# Patient Record
Sex: MALE | Race: WHITE | NOT HISPANIC OR LATINO
[De-identification: names, ages, dates, MRNs, and addresses within clinical notes are randomized per-mention and may not be internally consistent; named-entity substitution may affect disease eponyms.]

---

## 2015-06-10 RX ORDER — DOFETILIDE 0.25 MG/1
1 CAPSULE ORAL
Qty: 0 | Refills: 0 | DISCHARGE
Start: 2015-06-10

## 2017-01-25 ENCOUNTER — APPOINTMENT (OUTPATIENT)
Dept: ELECTROPHYSIOLOGY | Facility: CLINIC | Age: 72
End: 2017-01-25

## 2017-01-25 VITALS
HEIGHT: 68 IN | SYSTOLIC BLOOD PRESSURE: 160 MMHG | BODY MASS INDEX: 36.22 KG/M2 | DIASTOLIC BLOOD PRESSURE: 80 MMHG | HEART RATE: 75 BPM | WEIGHT: 239 LBS

## 2017-08-14 ENCOUNTER — TRANSCRIPTION ENCOUNTER (OUTPATIENT)
Age: 72
End: 2017-08-14

## 2017-08-14 ENCOUNTER — OUTPATIENT (OUTPATIENT)
Dept: INPATIENT UNIT | Facility: HOSPITAL | Age: 72
LOS: 1 days | Discharge: HOME | End: 2017-08-14
Payer: MEDICARE

## 2017-08-14 VITALS
OXYGEN SATURATION: 97 % | TEMPERATURE: 98 F | DIASTOLIC BLOOD PRESSURE: 81 MMHG | HEIGHT: 70 IN | SYSTOLIC BLOOD PRESSURE: 157 MMHG | WEIGHT: 229.06 LBS | HEART RATE: 80 BPM | RESPIRATION RATE: 18 BRPM

## 2017-08-14 DIAGNOSIS — R07.89 OTHER CHEST PAIN: ICD-10-CM

## 2017-08-14 DIAGNOSIS — Z95.5 PRESENCE OF CORONARY ANGIOPLASTY IMPLANT AND GRAFT: Chronic | ICD-10-CM

## 2017-08-14 PROCEDURE — 93010 ELECTROCARDIOGRAM REPORT: CPT

## 2017-08-14 RX ORDER — ATORVASTATIN CALCIUM 80 MG/1
20 TABLET, FILM COATED ORAL AT BEDTIME
Qty: 0 | Refills: 0 | Status: DISCONTINUED | OUTPATIENT
Start: 2017-08-14 | End: 2017-08-15

## 2017-08-14 RX ORDER — DOXAZOSIN MESYLATE 4 MG
8 TABLET ORAL AT BEDTIME
Qty: 0 | Refills: 0 | Status: DISCONTINUED | OUTPATIENT
Start: 2017-08-14 | End: 2017-08-15

## 2017-08-14 RX ORDER — METOPROLOL TARTRATE 50 MG
100 TABLET ORAL DAILY
Qty: 0 | Refills: 0 | Status: DISCONTINUED | OUTPATIENT
Start: 2017-08-14 | End: 2017-08-15

## 2017-08-14 RX ORDER — DOFETILIDE 0.25 MG/1
250 CAPSULE ORAL
Qty: 0 | Refills: 0 | Status: DISCONTINUED | OUTPATIENT
Start: 2017-08-14 | End: 2017-08-15

## 2017-08-14 RX ORDER — LOSARTAN POTASSIUM 100 MG/1
100 TABLET, FILM COATED ORAL DAILY
Qty: 0 | Refills: 0 | Status: DISCONTINUED | OUTPATIENT
Start: 2017-08-14 | End: 2017-08-15

## 2017-08-14 RX ORDER — CLOPIDOGREL BISULFATE 75 MG/1
75 TABLET, FILM COATED ORAL DAILY
Qty: 0 | Refills: 0 | Status: DISCONTINUED | OUTPATIENT
Start: 2017-08-14 | End: 2017-08-15

## 2017-08-14 RX ORDER — ASPIRIN/CALCIUM CARB/MAGNESIUM 324 MG
81 TABLET ORAL DAILY
Qty: 0 | Refills: 0 | Status: DISCONTINUED | OUTPATIENT
Start: 2017-08-14 | End: 2017-08-15

## 2017-08-14 RX ADMIN — Medication 8 MILLIGRAM(S): at 20:59

## 2017-08-14 RX ADMIN — Medication 100 MILLIGRAM(S): at 16:50

## 2017-08-14 RX ADMIN — DOFETILIDE 250 MICROGRAM(S): 0.25 CAPSULE ORAL at 17:20

## 2017-08-14 RX ADMIN — LOSARTAN POTASSIUM 100 MILLIGRAM(S): 100 TABLET, FILM COATED ORAL at 16:50

## 2017-08-14 RX ADMIN — ATORVASTATIN CALCIUM 20 MILLIGRAM(S): 80 TABLET, FILM COATED ORAL at 20:59

## 2017-08-14 NOTE — H&P CARDIOLOGY - PMH
Arthritis    BPH (Benign Prostatic Hyperplasia)    CAD (coronary artery disease)  s/p PCI t Cx 2013-[facility]  HTN (hypertension)    Hypercholesteremia    Obesity    PVD (peripheral vascular disease)  s/p b/l LE PCI Arthritis    BPH (Benign Prostatic Hyperplasia)    CAD (coronary artery disease)  s/p PCI t Cx 2013-[facility]  HTN (hypertension)    Hypercholesteremia    Obesity    PVD (peripheral vascular disease)  s/p b/l LE PCI

## 2017-08-14 NOTE — H&P CARDIOLOGY - HISTORY OF PRESENT ILLNESS
71 yo male PMH CAD with stent x2 (2013), HTN, HLD, Hx Afib (Xarelto last dose 8/12) s/p CV (2015), NSVT s/p cardioversion (2014) and PVD with BL peripheral stents and pernicious anemia presents to [facility] ER on 8/12 c/o wheezing and "pressure like" chest discomfort  with exertion 4 days PTA. Pain lasts approx 5-10 minutes, relieved with rest. In [facility] ER Trop x3 are negative, BNP 1160, CXR: BL pleural effusion with mild CHF- given IV lasix in ER x1 dose, TTE EF 55-65% with LAE, ECG: NSR with PVC's 73 yo male PMH CAD with stent x2 (2013), HTN, HLD, Hx Afib (Xarelto last dose 8/12) s/p CV (2015), NSVT s/p cardioversion (2014) and PVD with BL peripheral stents and pernicious anemia presents to [facility] ER on 8/12 c/o wheezing and "pressure like" chest discomfort  with exertion 4 days PTA. Pain lasts approx 5-10 minutes, relieved with rest. In [facility] ER Trop x3 are negative, BNP 1160, CXR: BL pleural effusion with mild CHF- given IV lasix in ER x1 dose, ECG: NSR with PVC's. Patient transferred to [facility] for cardiac cath for further cardiac evaluation. Upon arrival patient denies chest pain, palpitations, SOB, PND, orthopnea    TTE (1/2017) EF 49%, increased from 40%, dialted 40%, dialted RA, LA, mild MI, Trace TI

## 2017-08-14 NOTE — H&P CARDIOLOGY - PSH
Hernia    S/P coronary artery stent placement  2013 [facility]/vivi  cx x2 Hernia    S/P coronary artery stent placement  2013 [facility]/vivi  cx x2

## 2017-08-15 VITALS
DIASTOLIC BLOOD PRESSURE: 62 MMHG | HEART RATE: 72 BPM | SYSTOLIC BLOOD PRESSURE: 168 MMHG | RESPIRATION RATE: 18 BRPM | OXYGEN SATURATION: 96 % | TEMPERATURE: 98 F

## 2017-08-15 LAB
ANION GAP SERPL CALC-SCNC: 13 MMOL/L — SIGNIFICANT CHANGE UP (ref 5–17)
BUN SERPL-MCNC: 21 MG/DL — SIGNIFICANT CHANGE UP (ref 7–23)
CALCIUM SERPL-MCNC: 8.8 MG/DL — SIGNIFICANT CHANGE UP (ref 8.4–10.5)
CHLORIDE SERPL-SCNC: 104 MMOL/L — SIGNIFICANT CHANGE UP (ref 96–108)
CO2 SERPL-SCNC: 27 MMOL/L — SIGNIFICANT CHANGE UP (ref 22–31)
CREAT SERPL-MCNC: 0.98 MG/DL — SIGNIFICANT CHANGE UP (ref 0.5–1.3)
GLUCOSE SERPL-MCNC: 99 MG/DL — SIGNIFICANT CHANGE UP (ref 70–99)
HCT VFR BLD CALC: 37.1 % — LOW (ref 39–50)
HGB BLD-MCNC: 12.2 G/DL — LOW (ref 13–17)
MCHC RBC-ENTMCNC: 31.9 PG — SIGNIFICANT CHANGE UP (ref 27–34)
MCHC RBC-ENTMCNC: 33.1 GM/DL — SIGNIFICANT CHANGE UP (ref 32–36)
MCV RBC AUTO: 96.5 FL — SIGNIFICANT CHANGE UP (ref 80–100)
PLATELET # BLD AUTO: 161 K/UL — SIGNIFICANT CHANGE UP (ref 150–400)
POTASSIUM SERPL-MCNC: 3.6 MMOL/L — SIGNIFICANT CHANGE UP (ref 3.5–5.3)
POTASSIUM SERPL-SCNC: 3.6 MMOL/L — SIGNIFICANT CHANGE UP (ref 3.5–5.3)
RBC # BLD: 3.84 M/UL — LOW (ref 4.2–5.8)
RBC # FLD: 12.9 % — SIGNIFICANT CHANGE UP (ref 10.3–14.5)
SODIUM SERPL-SCNC: 144 MMOL/L — SIGNIFICANT CHANGE UP (ref 135–145)
WBC # BLD: 5.2 K/UL — SIGNIFICANT CHANGE UP (ref 3.8–10.5)
WBC # FLD AUTO: 5.2 K/UL — SIGNIFICANT CHANGE UP (ref 3.8–10.5)

## 2017-08-15 PROCEDURE — C1894: CPT

## 2017-08-15 PROCEDURE — 93458 L HRT ARTERY/VENTRICLE ANGIO: CPT | Mod: 59

## 2017-08-15 PROCEDURE — 93010 ELECTROCARDIOGRAM REPORT: CPT

## 2017-08-15 PROCEDURE — 92928 PRQ TCAT PLMT NTRAC ST 1 LES: CPT | Mod: LD

## 2017-08-15 PROCEDURE — C1887: CPT

## 2017-08-15 PROCEDURE — 85027 COMPLETE CBC AUTOMATED: CPT

## 2017-08-15 PROCEDURE — 80048 BASIC METABOLIC PNL TOTAL CA: CPT

## 2017-08-15 PROCEDURE — 93005 ELECTROCARDIOGRAM TRACING: CPT

## 2017-08-15 PROCEDURE — C1874: CPT

## 2017-08-15 PROCEDURE — C1769: CPT

## 2017-08-15 RX ORDER — ASPIRIN/CALCIUM CARB/MAGNESIUM 324 MG
1 TABLET ORAL
Qty: 30 | Refills: 11
Start: 2017-08-15 | End: 2018-08-09

## 2017-08-15 RX ORDER — ASPIRIN/CALCIUM CARB/MAGNESIUM 324 MG
1 TABLET ORAL
Qty: 30 | Refills: 11 | OUTPATIENT
Start: 2017-08-15 | End: 2018-08-09

## 2017-08-15 RX ORDER — CLOPIDOGREL BISULFATE 75 MG/1
1 TABLET, FILM COATED ORAL
Qty: 90 | Refills: 3 | OUTPATIENT
Start: 2017-08-15 | End: 2018-08-09

## 2017-08-15 RX ORDER — CLOPIDOGREL BISULFATE 75 MG/1
1 TABLET, FILM COATED ORAL
Qty: 30 | Refills: 11
Start: 2017-08-15 | End: 2018-08-09

## 2017-08-15 RX ORDER — CLOPIDOGREL BISULFATE 75 MG/1
1 TABLET, FILM COATED ORAL
Qty: 30 | Refills: 11 | OUTPATIENT
Start: 2017-08-15 | End: 2018-08-09

## 2017-08-15 RX ADMIN — Medication 100 MILLIGRAM(S): at 05:14

## 2017-08-15 RX ADMIN — LOSARTAN POTASSIUM 100 MILLIGRAM(S): 100 TABLET, FILM COATED ORAL at 05:14

## 2017-08-15 RX ADMIN — DOFETILIDE 250 MICROGRAM(S): 0.25 CAPSULE ORAL at 09:13

## 2017-08-15 RX ADMIN — Medication 81 MILLIGRAM(S): at 05:14

## 2017-08-15 RX ADMIN — CLOPIDOGREL BISULFATE 75 MILLIGRAM(S): 75 TABLET, FILM COATED ORAL at 05:14

## 2017-08-15 NOTE — DISCHARGE NOTE ADULT - MEDICATION SUMMARY - MEDICATIONS TO TAKE
I will START or STAY ON the medications listed below when I get home from the hospital:    Cozaar 100 mg oral tablet  -- 1 tab(s) by mouth once a day  home  -- Indication: For Hypertension     terazosin 10 mg oral capsule  -- 1 cap(s) by mouth once a day (at bedtime)  home  -- Indication: For Hypertension     dofetilide 250 mcg oral capsule  -- 1 cap(s) by mouth 2 times a day  home 1pm and HS  -- Indication: For afib    Xarelto 20 mg oral tablet  -- 1 tab(s) by mouth once a day (in the evening)  home  last dose 8/12  -- Indication: For afib    Lipitor 20 mg oral tablet  -- 1 tab(s) by mouth once a day (at bedtime)  home  -- Indication: For Hyperlipidemia     Toprol- mg oral tablet, extended release  -- 1 tab(s) by mouth once a day  home  -- Indication: For Hypertension     potassium chloride 10 mEq oral tablet, extended release  -- 1 tab(s) by mouth once a day  -- Indication: For supplement     cyanocobalamin 1000 mcg oral tablet  -- 1 tab(s) by mouth once a day  -- Indication: For supplement     Vitamin D3 2000 intl units oral capsule  -- 1 cap(s) by mouth once a day  -- Indication: For supplement

## 2017-08-15 NOTE — DISCHARGE NOTE ADULT - PLAN OF CARE
Pt remains chest pain free and understands post cath discharge instructions No heavy lifting or pushing/pulling with procedure arm for 2 weeks. No driving for 2 days. You may shower 24 hours following the procedure but avoid baths/swimming for 1 week. Check your wrist site for bleeding and/or swelling daily following procedure and call your doctor immediately if it occurs or if you experience increased pain at the site. Follow up with your cardiologist in 1-2 weeks. You may call [facility] if you have any questions/concerns regarding your procedure [phone]. Your blood pressure will be controlled. Continue with your blood pressure medications; eat a heart healthy diet with low salt diet; exercise regularly (consult with your physician or cardiologist first); maintain a heart healthy weight; if you smoke - quit (A resource to help you stop smoking is the [facility] – phone number [phone].); include healthy ways to manage stress. Continue to follow with your primary care physician or cardiologist. Your LDL cholesterol will be less than 70mg/dL Continue with your cholesterol medications. Eat a heart healthy diet that is low in saturated fats and salt, and includes whole grains, fruits, vegetables and lean protein; exercise regularly (consult with your physician or cardiologist first); maintain a heart healthy weight. Continue to follow with your primary physician or cardiologist for treatment goals, continue medication, have liver function testing every 3 months as anti lipid medications can cause liver irritation. If you smoke - quit (A resource to help you stop smoking is the [facility] for [facility] – phone number [phone].).

## 2017-08-15 NOTE — DISCHARGE NOTE ADULT - CONDITION (STATED IN TERMS THAT PERMIT A SPECIFIC MEASURABLE COMPARISON WITH CONDITION ON ADMISSION):
at time of discharge patient is stable. Ambulated today, tolerated well. Right radial site is WDL, no hematoma bleeding or swelling

## 2017-08-15 NOTE — DISCHARGE NOTE ADULT - CARE PROVIDER_API CALL
Viraj Merida (DO), Cardiovascular Disease; Internal Medicine; Interventional Cardiology  [address] [place] [address]  Phone: [phone]  Fax: [phone]

## 2017-08-15 NOTE — DISCHARGE NOTE ADULT - CARE PLAN
Principal Discharge DX:	CAD (coronary artery disease)  Goal:	Pt remains chest pain free and understands post cath discharge instructions  Instructions for follow-up, activity and diet:	No heavy lifting or pushing/pulling with procedure arm for 2 weeks. No driving for 2 days. You may shower 24 hours following the procedure but avoid baths/swimming for 1 week. Check your wrist site for bleeding and/or swelling daily following procedure and call your doctor immediately if it occurs or if you experience increased pain at the site. Follow up with your cardiologist in 1-2 weeks. You may call [facility] if you have any questions/concerns regarding your procedure [phone].  Secondary Diagnosis:	HTN (hypertension)  Goal:	Your blood pressure will be controlled.  Instructions for follow-up, activity and diet:	Continue with your blood pressure medications; eat a heart healthy diet with low salt diet; exercise regularly (consult with your physician or cardiologist first); maintain a heart healthy weight; if you smoke - quit (A resource to help you stop smoking is the [facility] [employer] – phone number [phone].); include healthy ways to manage stress. Continue to follow with your primary care physician or cardiologist.  Secondary Diagnosis:	Hypercholesteremia  Goal:	Your LDL cholesterol will be less than 70mg/dL  Instructions for follow-up, activity and diet:	Continue with your cholesterol medications. Eat a heart healthy diet that is low in saturated fats and salt, and includes whole grains, fruits, vegetables and lean protein; exercise regularly (consult with your physician or cardiologist first); maintain a heart healthy weight. Continue to follow with your primary physician or cardiologist for treatment goals, continue medication, have liver function testing every 3 months as anti lipid medications can cause liver irritation. If you smoke - quit (A resource to help you stop smoking is the [facility] [employer] Control – phone number [phone].). Principal Discharge DX:	CAD (coronary artery disease)  Goal:	Pt remains chest pain free and understands post cath discharge instructions  Instructions for follow-up, activity and diet:	No heavy lifting or pushing/pulling with procedure arm for 2 weeks. No driving for 2 days. You may shower 24 hours following the procedure but avoid baths/swimming for 1 week. Check your wrist site for bleeding and/or swelling daily following procedure and call your doctor immediately if it occurs or if you experience increased pain at the site. Follow up with your cardiologist in 1-2 weeks. You may call [facility] if you have any questions/concerns regarding your procedure [phone].  Secondary Diagnosis:	HTN (hypertension)  Goal:	Your blood pressure will be controlled.  Instructions for follow-up, activity and diet:	Continue with your blood pressure medications; eat a heart healthy diet with low salt diet; exercise regularly (consult with your physician or cardiologist first); maintain a heart healthy weight; if you smoke - quit (A resource to help you stop smoking is the [facility] [employer] – phone number [phone].); include healthy ways to manage stress. Continue to follow with your primary care physician or cardiologist.  Secondary Diagnosis:	Hypercholesteremia  Goal:	Your LDL cholesterol will be less than 70mg/dL  Instructions for follow-up, activity and diet:	Continue with your cholesterol medications. Eat a heart healthy diet that is low in saturated fats and salt, and includes whole grains, fruits, vegetables and lean protein; exercise regularly (consult with your physician or cardiologist first); maintain a heart healthy weight. Continue to follow with your primary physician or cardiologist for treatment goals, continue medication, have liver function testing every 3 months as anti lipid medications can cause liver irritation. If you smoke - quit (A resource to help you stop smoking is the [facility] [employer] Control – phone number [phone].). Principal Discharge DX:	CAD (coronary artery disease)  Goal:	Pt remains chest pain free and understands post cath discharge instructions  Instructions for follow-up, activity and diet:	No heavy lifting or pushing/pulling with procedure arm for 2 weeks. No driving for 2 days. You may shower 24 hours following the procedure but avoid baths/swimming for 1 week. Check your wrist site for bleeding and/or swelling daily following procedure and call your doctor immediately if it occurs or if you experience increased pain at the site. Follow up with your cardiologist in 1-2 weeks. You may call [facility] if you have any questions/concerns regarding your procedure [phone].  Secondary Diagnosis:	HTN (hypertension)  Goal:	Your blood pressure will be controlled.  Instructions for follow-up, activity and diet:	Continue with your blood pressure medications; eat a heart healthy diet with low salt diet; exercise regularly (consult with your physician or cardiologist first); maintain a heart healthy weight; if you smoke - quit (A resource to help you stop smoking is the [facility] [employer] – phone number [phone].); include healthy ways to manage stress. Continue to follow with your primary care physician or cardiologist.  Secondary Diagnosis:	Hypercholesteremia  Goal:	Your LDL cholesterol will be less than 70mg/dL  Instructions for follow-up, activity and diet:	Continue with your cholesterol medications. Eat a heart healthy diet that is low in saturated fats and salt, and includes whole grains, fruits, vegetables and lean protein; exercise regularly (consult with your physician or cardiologist first); maintain a heart healthy weight. Continue to follow with your primary physician or cardiologist for treatment goals, continue medication, have liver function testing every 3 months as anti lipid medications can cause liver irritation. If you smoke - quit (A resource to help you stop smoking is the [facility] [employer] Control – phone number [phone].). Principal Discharge DX:	CAD (coronary artery disease)  Goal:	Pt remains chest pain free and understands post cath discharge instructions  Instructions for follow-up, activity and diet:	No heavy lifting or pushing/pulling with procedure arm for 2 weeks. No driving for 2 days. You may shower 24 hours following the procedure but avoid baths/swimming for 1 week. Check your wrist site for bleeding and/or swelling daily following procedure and call your doctor immediately if it occurs or if you experience increased pain at the site. Follow up with your cardiologist in 1-2 weeks. You may call [facility] if you have any questions/concerns regarding your procedure [phone].  Secondary Diagnosis:	HTN (hypertension)  Goal:	Your blood pressure will be controlled.  Instructions for follow-up, activity and diet:	Continue with your blood pressure medications; eat a heart healthy diet with low salt diet; exercise regularly (consult with your physician or cardiologist first); maintain a heart healthy weight; if you smoke - quit (A resource to help you stop smoking is the [facility] [employer] – phone number [phone].); include healthy ways to manage stress. Continue to follow with your primary care physician or cardiologist.  Secondary Diagnosis:	Hypercholesteremia  Goal:	Your LDL cholesterol will be less than 70mg/dL  Instructions for follow-up, activity and diet:	Continue with your cholesterol medications. Eat a heart healthy diet that is low in saturated fats and salt, and includes whole grains, fruits, vegetables and lean protein; exercise regularly (consult with your physician or cardiologist first); maintain a heart healthy weight. Continue to follow with your primary physician or cardiologist for treatment goals, continue medication, have liver function testing every 3 months as anti lipid medications can cause liver irritation. If you smoke - quit (A resource to help you stop smoking is the [facility] [employer] Control – phone number [phone].). Principal Discharge DX:	CAD (coronary artery disease)  Goal:	Pt remains chest pain free and understands post cath discharge instructions  Instructions for follow-up, activity and diet:	No heavy lifting or pushing/pulling with procedure arm for 2 weeks. No driving for 2 days. You may shower 24 hours following the procedure but avoid baths/swimming for 1 week. Check your wrist site for bleeding and/or swelling daily following procedure and call your doctor immediately if it occurs or if you experience increased pain at the site. Follow up with your cardiologist in 1-2 weeks. You may call [facility] if you have any questions/concerns regarding your procedure [phone].  Secondary Diagnosis:	HTN (hypertension)  Goal:	Your blood pressure will be controlled.  Instructions for follow-up, activity and diet:	Continue with your blood pressure medications; eat a heart healthy diet with low salt diet; exercise regularly (consult with your physician or cardiologist first); maintain a heart healthy weight; if you smoke - quit (A resource to help you stop smoking is the [facility] [employer] – phone number [phone].); include healthy ways to manage stress. Continue to follow with your primary care physician or cardiologist.  Secondary Diagnosis:	Hypercholesteremia  Goal:	Your LDL cholesterol will be less than 70mg/dL  Instructions for follow-up, activity and diet:	Continue with your cholesterol medications. Eat a heart healthy diet that is low in saturated fats and salt, and includes whole grains, fruits, vegetables and lean protein; exercise regularly (consult with your physician or cardiologist first); maintain a heart healthy weight. Continue to follow with your primary physician or cardiologist for treatment goals, continue medication, have liver function testing every 3 months as anti lipid medications can cause liver irritation. If you smoke - quit (A resource to help you stop smoking is the [facility] [employer] [facility] – phone number [phone].).

## 2017-08-15 NOTE — DISCHARGE NOTE ADULT - HOSPITAL COURSE
71 yo male PMH CAD with stent x2 (2013), HTN, HLD, Hx Afib (Xarelto last dose 8/12) s/p CV (2015), NSVT s/p cardioversion (2014) and PVD with BL peripheral stents and pernicious anemia presents to [facility] ER on 8/12 c/o wheezing and "pressure like" chest discomfort  with exertion 4 days PTA. Pain lasts approx 5-10 minutes, relieved with rest. In [facility] ER Trop x3 are negative, BNP 1160, CXR: BL pleural effusion with mild CHF- given IV lasix in ER x1 dose, ECG: NSR with PVC's. Patient transferred to [facility] for cardiac cath for further cardiac evaluation. Pt is now s/p cardiac cath HAYLEY x 1 mLAD (80%). Pt tolerated the procedure well. Cardiac cath site benign. Post-procedure discharge instructions discussed and questions addressed.

## 2017-08-25 RX ORDER — CLOPIDOGREL BISULFATE 75 MG/1
1 TABLET, FILM COATED ORAL
Qty: 0 | Refills: 0 | COMMUNITY

## 2018-02-11 NOTE — DISCHARGE NOTE ADULT - PATIENT PORTAL LINK FT
“You can access the Follow[employer] Patient Portal, offered by [facility], by registering with the following website: [email or web address]” 85

## 2018-02-14 ENCOUNTER — APPOINTMENT (OUTPATIENT)
Dept: ELECTROPHYSIOLOGY | Facility: CLINIC | Age: 73
End: 2018-02-14

## 2018-02-21 ENCOUNTER — APPOINTMENT (OUTPATIENT)
Dept: ELECTROPHYSIOLOGY | Facility: CLINIC | Age: 73
End: 2018-02-21
Payer: MEDICARE

## 2018-02-21 VITALS
HEART RATE: 69 BPM | WEIGHT: 192 LBS | SYSTOLIC BLOOD PRESSURE: 118 MMHG | BODY MASS INDEX: 29.19 KG/M2 | DIASTOLIC BLOOD PRESSURE: 66 MMHG

## 2018-02-21 DIAGNOSIS — I42.9 CARDIOMYOPATHY, UNSPECIFIED: ICD-10-CM

## 2018-02-21 PROCEDURE — 93000 ELECTROCARDIOGRAM COMPLETE: CPT

## 2018-02-21 PROCEDURE — 99215 OFFICE O/P EST HI 40 MIN: CPT

## 2018-03-22 ENCOUNTER — OUTPATIENT (OUTPATIENT)
Dept: OUTPATIENT SERVICES | Facility: HOSPITAL | Age: 73
LOS: 1 days | End: 2018-03-22
Payer: MEDICARE

## 2018-03-22 VITALS
RESPIRATION RATE: 18 BRPM | HEIGHT: 70 IN | DIASTOLIC BLOOD PRESSURE: 78 MMHG | SYSTOLIC BLOOD PRESSURE: 163 MMHG | TEMPERATURE: 98 F | HEART RATE: 66 BPM | WEIGHT: 194.01 LBS | OXYGEN SATURATION: 96 %

## 2018-03-22 VITALS
OXYGEN SATURATION: 96 % | DIASTOLIC BLOOD PRESSURE: 18 MMHG | WEIGHT: 194.01 LBS | HEART RATE: 65 BPM | TEMPERATURE: 98 F | SYSTOLIC BLOOD PRESSURE: 163 MMHG | HEIGHT: 70 IN | RESPIRATION RATE: 18 BRPM

## 2018-03-22 DIAGNOSIS — Z95.5 PRESENCE OF CORONARY ANGIOPLASTY IMPLANT AND GRAFT: Chronic | ICD-10-CM

## 2018-03-22 DIAGNOSIS — Z01.810 ENCOUNTER FOR PREPROCEDURAL CARDIOVASCULAR EXAMINATION: ICD-10-CM

## 2018-03-22 LAB
ANION GAP SERPL CALC-SCNC: 12 MMOL/L — SIGNIFICANT CHANGE UP (ref 5–17)
APTT BLD: 37.5 SEC — HIGH (ref 27.5–37.4)
BASOPHILS # BLD AUTO: 0 K/UL — SIGNIFICANT CHANGE UP (ref 0–0.2)
BASOPHILS NFR BLD AUTO: 0.3 % — SIGNIFICANT CHANGE UP (ref 0–2)
BLD GP AB SCN SERPL QL: SIGNIFICANT CHANGE UP
BUN SERPL-MCNC: 17 MG/DL — SIGNIFICANT CHANGE UP (ref 8–20)
CALCIUM SERPL-MCNC: 8.9 MG/DL — SIGNIFICANT CHANGE UP (ref 8.6–10.2)
CHLORIDE SERPL-SCNC: 100 MMOL/L — SIGNIFICANT CHANGE UP (ref 98–107)
CO2 SERPL-SCNC: 27 MMOL/L — SIGNIFICANT CHANGE UP (ref 22–29)
CREAT SERPL-MCNC: 0.66 MG/DL — SIGNIFICANT CHANGE UP (ref 0.5–1.3)
EOSINOPHIL # BLD AUTO: 0 K/UL — SIGNIFICANT CHANGE UP (ref 0–0.5)
EOSINOPHIL NFR BLD AUTO: 1.5 % — SIGNIFICANT CHANGE UP (ref 0–5)
GLUCOSE SERPL-MCNC: 101 MG/DL — SIGNIFICANT CHANGE UP (ref 70–115)
HCT VFR BLD CALC: 35.1 % — LOW (ref 42–52)
HGB BLD-MCNC: 11.7 G/DL — LOW (ref 14–18)
INR BLD: 1.37 RATIO — HIGH (ref 0.88–1.16)
LYMPHOCYTES # BLD AUTO: 0.7 K/UL — LOW (ref 1–4.8)
LYMPHOCYTES # BLD AUTO: 21.4 % — SIGNIFICANT CHANGE UP (ref 20–55)
MAGNESIUM SERPL-MCNC: 1.8 MG/DL — SIGNIFICANT CHANGE UP (ref 1.6–2.6)
MCHC RBC-ENTMCNC: 32.8 PG — HIGH (ref 27–31)
MCHC RBC-ENTMCNC: 33.3 G/DL — SIGNIFICANT CHANGE UP (ref 32–36)
MCV RBC AUTO: 98.3 FL — HIGH (ref 80–94)
MONOCYTES # BLD AUTO: 0.6 K/UL — SIGNIFICANT CHANGE UP (ref 0–0.8)
MONOCYTES NFR BLD AUTO: 19 % — HIGH (ref 3–10)
NEUTROPHILS # BLD AUTO: 1.9 K/UL — SIGNIFICANT CHANGE UP (ref 1.8–8)
NEUTROPHILS NFR BLD AUTO: 57.8 % — SIGNIFICANT CHANGE UP (ref 37–73)
PLATELET # BLD AUTO: 145 K/UL — LOW (ref 150–400)
POTASSIUM SERPL-MCNC: 3.7 MMOL/L — SIGNIFICANT CHANGE UP (ref 3.5–5.3)
POTASSIUM SERPL-SCNC: 3.7 MMOL/L — SIGNIFICANT CHANGE UP (ref 3.5–5.3)
PROTHROM AB SERPL-ACNC: 15.2 SEC — HIGH (ref 9.8–12.7)
RBC # BLD: 3.57 M/UL — LOW (ref 4.6–6.2)
RBC # FLD: 13.6 % — SIGNIFICANT CHANGE UP (ref 11–15.6)
SODIUM SERPL-SCNC: 139 MMOL/L — SIGNIFICANT CHANGE UP (ref 135–145)
TYPE + AB SCN PNL BLD: SIGNIFICANT CHANGE UP
WBC # BLD: 3.4 K/UL — LOW (ref 4.8–10.8)
WBC # FLD AUTO: 3.4 K/UL — LOW (ref 4.8–10.8)

## 2018-03-22 PROCEDURE — 85027 COMPLETE CBC AUTOMATED: CPT

## 2018-03-22 PROCEDURE — 86901 BLOOD TYPING SEROLOGIC RH(D): CPT

## 2018-03-22 PROCEDURE — 86900 BLOOD TYPING SEROLOGIC ABO: CPT

## 2018-03-22 PROCEDURE — 36415 COLL VENOUS BLD VENIPUNCTURE: CPT

## 2018-03-22 PROCEDURE — 85610 PROTHROMBIN TIME: CPT

## 2018-03-22 PROCEDURE — G0463: CPT

## 2018-03-22 PROCEDURE — 93005 ELECTROCARDIOGRAM TRACING: CPT

## 2018-03-22 PROCEDURE — 85730 THROMBOPLASTIN TIME PARTIAL: CPT

## 2018-03-22 PROCEDURE — 83735 ASSAY OF MAGNESIUM: CPT

## 2018-03-22 PROCEDURE — 80048 BASIC METABOLIC PNL TOTAL CA: CPT

## 2018-03-22 PROCEDURE — 86850 RBC ANTIBODY SCREEN: CPT

## 2018-03-22 PROCEDURE — 93010 ELECTROCARDIOGRAM REPORT: CPT

## 2018-03-22 RX ORDER — METOPROLOL TARTRATE 50 MG
1 TABLET ORAL
Qty: 0 | Refills: 0 | COMMUNITY

## 2018-03-22 RX ORDER — POTASSIUM CHLORIDE 20 MEQ
1 PACKET (EA) ORAL
Qty: 0 | Refills: 0 | COMMUNITY

## 2018-03-22 RX ORDER — LOSARTAN POTASSIUM 100 MG/1
1 TABLET, FILM COATED ORAL
Qty: 0 | Refills: 0 | COMMUNITY

## 2018-03-22 NOTE — H&P PST ADULT - PMH
Arthritis    Atrial fibrillation  on tikosyn and xarelto  BPH (Benign Prostatic Hyperplasia)    CAD (coronary artery disease)  s/p PCI LCx 2013 and mLAD 8/2017  Cardiomyopathy    HFrEF (heart failure with reduced ejection fraction)    HTN (hypertension)    Hypercholesteremia    Obesity    PAD (peripheral artery disease)  s/p PCI LLE stent 2014 Anemia  received iron transfusion  Arthritis    Atrial fibrillation  on tikosyn and xarelto  BPH (Benign Prostatic Hyperplasia)    CAD (coronary artery disease)  s/p PCI LCx 2013 and mLAD 8/2017  Cardiomyopathy    HFrEF (heart failure with reduced ejection fraction)    HTN (hypertension)    Hypercholesteremia    Obesity    PAD (peripheral artery disease)  s/p PCI LLE stent 2014

## 2018-03-22 NOTE — H&P PST ADULT - HISTORY OF PRESENT ILLNESS
73 yo male with pmhx CAD s/p PCI LCx 2013 and mLAD 8/2017, chronic HFeEF EF 26%, class II CHF, HTN, PAD s/p left superficial femoral artery leg stent 2014 and atrial fibrillation now maintaining SR on Tikosyn and anticoagulation.  He presents today for PST for elective primary prevention ICD implant.    TTE 1/27/18: EF 26%, dilated RA, dilated LA, severe global LV dysfunction  SPECT stress 8/29/17: normal stress test  Cath 8/14/17: EF 40% 71 yo male with pmhx CAD s/p PCI LCx 2013 and mLAD 8/2017, chronic HFeEF EF 26%, class II CHF, HTN, PAD s/p bilateral LE PCI and atrial fibrillation now maintaining SR on Tikosyn and anticoagulation.  He presents today for PST for elective primary prevention ICD implant.    TTE 1/27/18: EF 26%, dilated RA, dilated LA, severe global LV dysfunction  SPECT stress 8/29/17: normal stress test  Cath 8/14/17: EF 40% s/p pci mLAD

## 2018-03-22 NOTE — H&P PST ADULT - PSH
Hernia    S/P coronary artery stent placement  2013 [facility]/vivi  cx x2 Hernia  inguinal  S/P coronary artery stent placement

## 2018-03-22 NOTE — H&P PST ADULT - NSANTHOSAYNRD_GEN_A_CORE
No. DIANDRA screening performed.  STOP BANG Legend: 0-2 = LOW Risk; 3-4 = INTERMEDIATE Risk; 5-8 = HIGH Risk

## 2018-03-27 ENCOUNTER — INPATIENT (INPATIENT)
Facility: HOSPITAL | Age: 73
LOS: 0 days | Discharge: HOME | DRG: 227 | End: 2018-03-28
Attending: STUDENT IN AN ORGANIZED HEALTH CARE EDUCATION/TRAINING PROGRAM | Admitting: STUDENT IN AN ORGANIZED HEALTH CARE EDUCATION/TRAINING PROGRAM
Payer: COMMERCIAL

## 2018-03-27 ENCOUNTER — TRANSCRIPTION ENCOUNTER (OUTPATIENT)
Age: 73
End: 2018-03-27

## 2018-03-27 VITALS
RESPIRATION RATE: 18 BRPM | DIASTOLIC BLOOD PRESSURE: 86 MMHG | OXYGEN SATURATION: 100 % | TEMPERATURE: 99 F | SYSTOLIC BLOOD PRESSURE: 183 MMHG | HEART RATE: 73 BPM

## 2018-03-27 DIAGNOSIS — Z01.810 ENCOUNTER FOR PREPROCEDURAL CARDIOVASCULAR EXAMINATION: ICD-10-CM

## 2018-03-27 DIAGNOSIS — Z95.5 PRESENCE OF CORONARY ANGIOPLASTY IMPLANT AND GRAFT: Chronic | ICD-10-CM

## 2018-03-27 DIAGNOSIS — I42.9 CARDIOMYOPATHY, UNSPECIFIED: ICD-10-CM

## 2018-03-27 PROCEDURE — 93010 ELECTROCARDIOGRAM REPORT: CPT

## 2018-03-27 RX ORDER — DOFETILIDE 0.25 MG/1
250 CAPSULE ORAL
Qty: 0 | Refills: 0 | Status: DISCONTINUED | OUTPATIENT
Start: 2018-03-27 | End: 2018-03-28

## 2018-03-27 RX ORDER — BENZOCAINE AND MENTHOL 5; 1 G/100ML; G/100ML
1 LIQUID ORAL
Qty: 0 | Refills: 0 | Status: DISCONTINUED | OUTPATIENT
Start: 2018-03-27 | End: 2018-03-28

## 2018-03-27 RX ORDER — METOPROLOL TARTRATE 50 MG
100 TABLET ORAL DAILY
Qty: 0 | Refills: 0 | Status: DISCONTINUED | OUTPATIENT
Start: 2018-03-27 | End: 2018-03-28

## 2018-03-27 RX ORDER — SACUBITRIL AND VALSARTAN 24; 26 MG/1; MG/1
1 TABLET, FILM COATED ORAL
Qty: 0 | Refills: 0 | Status: DISCONTINUED | OUTPATIENT
Start: 2018-03-27 | End: 2018-03-28

## 2018-03-27 RX ORDER — SPIRONOLACTONE 25 MG/1
25 TABLET, FILM COATED ORAL DAILY
Qty: 0 | Refills: 0 | Status: DISCONTINUED | OUTPATIENT
Start: 2018-03-27 | End: 2018-03-28

## 2018-03-27 RX ORDER — CEFAZOLIN SODIUM 1 G
2000 VIAL (EA) INJECTION EVERY 8 HOURS
Qty: 0 | Refills: 0 | Status: COMPLETED | OUTPATIENT
Start: 2018-03-27 | End: 2018-03-28

## 2018-03-27 RX ORDER — FUROSEMIDE 40 MG
20 TABLET ORAL DAILY
Qty: 0 | Refills: 0 | Status: DISCONTINUED | OUTPATIENT
Start: 2018-03-27 | End: 2018-03-28

## 2018-03-27 RX ORDER — METOPROLOL TARTRATE 50 MG
25 TABLET ORAL
Qty: 0 | Refills: 0 | Status: DISCONTINUED | OUTPATIENT
Start: 2018-03-27 | End: 2018-03-28

## 2018-03-27 RX ORDER — CHOLECALCIFEROL (VITAMIN D3) 125 MCG
2000 CAPSULE ORAL DAILY
Qty: 0 | Refills: 0 | Status: DISCONTINUED | OUTPATIENT
Start: 2018-03-27 | End: 2018-03-28

## 2018-03-27 RX ORDER — ALPRAZOLAM 0.25 MG
0.25 TABLET ORAL EVERY 6 HOURS
Qty: 0 | Refills: 0 | Status: DISCONTINUED | OUTPATIENT
Start: 2018-03-27 | End: 2018-03-28

## 2018-03-27 RX ORDER — ASPIRIN/CALCIUM CARB/MAGNESIUM 324 MG
81 TABLET ORAL DAILY
Qty: 0 | Refills: 0 | Status: DISCONTINUED | OUTPATIENT
Start: 2018-03-27 | End: 2018-03-28

## 2018-03-27 RX ORDER — ACETAMINOPHEN 500 MG
650 TABLET ORAL EVERY 6 HOURS
Qty: 0 | Refills: 0 | Status: DISCONTINUED | OUTPATIENT
Start: 2018-03-27 | End: 2018-03-28

## 2018-03-27 RX ORDER — ATORVASTATIN CALCIUM 80 MG/1
20 TABLET, FILM COATED ORAL AT BEDTIME
Qty: 0 | Refills: 0 | Status: DISCONTINUED | OUTPATIENT
Start: 2018-03-27 | End: 2018-03-28

## 2018-03-27 RX ORDER — PREGABALIN 225 MG/1
1000 CAPSULE ORAL DAILY
Qty: 0 | Refills: 0 | Status: DISCONTINUED | OUTPATIENT
Start: 2018-03-27 | End: 2018-03-28

## 2018-03-27 RX ADMIN — DOFETILIDE 250 MICROGRAM(S): 0.25 CAPSULE ORAL at 19:47

## 2018-03-27 RX ADMIN — Medication 100 MILLIGRAM(S): at 23:37

## 2018-03-27 RX ADMIN — ATORVASTATIN CALCIUM 20 MILLIGRAM(S): 80 TABLET, FILM COATED ORAL at 22:34

## 2018-03-27 RX ADMIN — SACUBITRIL AND VALSARTAN 1 TABLET(S): 24; 26 TABLET, FILM COATED ORAL at 19:47

## 2018-03-27 NOTE — DISCHARGE NOTE ADULT - HOSPITAL COURSE
71 yo male with pmhx CAD s/p PCI LCx 2013 and mLAD 8/2017, ICM with chronic class II HFeEF (EF 26%) despite long-standing optimal medical therapy, HTN, PAD s/p left superficial femoral artery leg stent 2014 and atrial fibrillation now maintaining SR on Tikosyn. He presented electively 3/27/18 and underwent uncomplicated primary prevention Medtronic dual chamber ICD implant. 71 yo male with pmhx CAD s/p PCI LCx 2013 and mLAD 8/2017, ICM with chronic class II HFeEF (EF 26%) despite long-standing optimal medical therapy, HTN, PAD s/p left superficial femoral artery leg stent 2014 and atrial fibrillation now maintaining SR on Tikosyn. He presented electively 3/27/18 and underwent uncomplicated primary prevention Medtronic dual chamber ICD implant. The patient was observed overnight without event and was discharged home the following morning with a plan for outpatient follow up.

## 2018-03-27 NOTE — DISCHARGE NOTE ADULT - PATIENT PORTAL LINK FT
You can access the [facility] Patient Portal, offered by [facility], by registering with the following website: [email or web address]

## 2018-03-27 NOTE — DISCHARGE NOTE ADULT - CARE PLAN
Principal Discharge DX:	Chronic systolic heart failure  Goal:	optimize cardiac health  Assessment and plan of treatment:	Cardiac Device Implant Post Operative Instructions  - Bruising around the implant site or over the chest, side or arm near the incision is normal, and will take a few weeks to resolve.  -Do not lift the affected arm higher than 90 degrees (shoulder height) in any direction for 4 weeks.   - Do not push, pull or lift anything heavier than 10 lbs (about a gallon of milk) with the affected arm for 4 weeks.     - Do not touch the incision until it is completely healed.   - There are Steristrips (white strips of tape) on your incision, which will start to peal off on their own over the next 2-3 weeks. Do not pick at or peal off the Steristrips.   - Do not apply soaps, creams, lotions, ointments or powders to the incision until it is completely healed.  You should call the doctor if:   - you notice redness, drainage, swelling, increased tenderness, hot sensation around the  incision, bleeding or incision edges pulling apart.  - your temperature is greater than 100 degress F for more than 24 hours.  - you notice swelling or bulging at the incision or around the device that was not there when you left the hospital or is increasing in size.  -you experience increased difficulty breathing.  - you notice new/worsening swelling in your legs and ankles.  - you faint or have dizzy spells.  -you have any questions or concerns regarding your device or the procedure.

## 2018-03-27 NOTE — DISCHARGE NOTE ADULT - CARE PROVIDER_API CALL
Sha Chan  [facility]  [address] [place] [address]  Phone: [phone]  Fax: (       -

## 2018-03-27 NOTE — DISCHARGE NOTE ADULT - INSTRUCTIONS
Choose lean meats and poultry without skin and prepare them without added saturated and trans fat.  Eat fish at least twice a week. Recent research shows that eating oily fish containing omega-3 fatty acids (for example, salmon, trout and herring) may help lower your risk of death from coronary artery disease.  Select fat-free, 1 percent fat and low-fat dairy products.  Cut back on foods containing partially hydrogenated vegetable oils to reduce trans fat in your diet.   To lower cholesterol, reduce saturated fat to no more than 5 to 6 percent of total calories. For someone eating 2,000 calories a day, that’s about 13 grams of saturated fat.  Cut back on beverages and foods with added sugars.  Choose and prepare foods with little or no salt. To lower blood pressure, aim to eat no more than 2,400 milligrams of sodium per day. Reducing daily intake to 1,500 mg is desirable because it can lower blood pressure even further.  If you drink alcohol, drink in moderation. That means one drink per day if you’re a woman and two drinks  per day if you’re a man.  Follow the American Heart Association recommendations when you eat out, and keep an eye on your portion sizes. monitor Pacemaker site for signs of infection

## 2018-03-27 NOTE — DISCHARGE NOTE ADULT - ADDITIONAL INSTRUCTIONS
Follow up with Dr. Chan at [facility] in 2-3 weeks. Please call [phone] to schedule an appointment.

## 2018-03-27 NOTE — DISCHARGE NOTE ADULT - PLAN OF CARE
Cardiac Device Implant Post Operative Instructions  - Bruising around the implant site or over the chest, side or arm near the incision is normal, and will take a few weeks to resolve.  -Do not lift the affected arm higher than 90 degrees (shoulder height) in any direction for 4 weeks.   - Do not push, pull or lift anything heavier than 10 lbs (about a gallon of milk) with the affected arm for 4 weeks.     - Do not touch the incision until it is completely healed.   - There are Steristrips (white strips of tape) on your incision, which will start to peal off on their own over the next 2-3 weeks. Do not pick at or peal off the Steristrips.   - Do not apply soaps, creams, lotions, ointments or powders to the incision until it is completely healed.  You should call the doctor if:   - you notice redness, drainage, swelling, increased tenderness, hot sensation around the  incision, bleeding or incision edges pulling apart.  - your temperature is greater than 100 degress F for more than 24 hours.  - you notice swelling or bulging at the incision or around the device that was not there when you left the hospital or is increasing in size.  -you experience increased difficulty breathing.  - you notice new/worsening swelling in your legs and ankles.  - you faint or have dizzy spells.  -you have any questions or concerns regarding your device or the procedure. optimize cardiac health

## 2018-03-27 NOTE — PROGRESS NOTE ADULT - SUBJECTIVE AND OBJECTIVE BOX
PROCEDURE(S): [employer] Dual Chamber ICD Implant     ELECTRPHYSIOLOGIST(S): Sha Chan MD    COMPLICATIONS:  none          DISPOSITION:  Observation Unit           CONDITION: Stable      Pt doing well s/p MDT dual chamber ICD implant (DDD 70-130bpm) via cephalic cutdown. Denies complaint    Exam:   T(C): 37.4 (03-27-18 @ 12:23), Max: 37.4 (03-27-18 @ 12:23)  HR: 65 (03-27-18 @ 16:46) (65 - 73)  BP: 146/64 (03-27-18 @ 16:46) (146/64 - 183/86)  RR: 18 (03-27-18 @ 16:46) (18 - 18)  SpO2: 98% (03-27-18 @ 16:46) (98% - 100%)    VSS, NAD, A&O x 3  Incision: Dressing C/D/I; no bleeding, hematoma, erythema or edema  Card: S1/S2, RRR, no m/g/r  Resp: lungs CTA b/l  Abd: S/NT/ND  Ext: no edema, distal pulses intact    Assessment:   71 yo male with pmhx CAD s/p PCI LCx 2013 and mLAD 8/2017, chronic HFeEF EF 26%, class II CHF, HTN, PAD s/p left superficial femoral artery leg stent 2014 and atrial fibrillation now maintaining SR on Tikosyn. Now status post uncomplicated primary prevention MDT ICD implant.     Plan:   Bedrest x 4 hours post implant, then OOB w/ assist & progress as tolerated.    Continued observation on telemetry overnight.   Cont Ancef 2gm IV q 8 hours x 2 additional doses to complete 24 hour course.   Pain control with PO analgesia PRN.   NO HEPARIN OR LOVENOX, INCLUDING PROPHYLACTIC/SUBCUT DOSING, UNTIL OTHERWISE ADVISED BY EP.   Resume Xarelto 3/31/18 PM.  Resume other home medications.   PA/Lat CXR and device check in AM.   Pending status overnight, anticipate d/c home tomorrow with outpt f/up in 1-2 weeks.

## 2018-03-27 NOTE — DISCHARGE NOTE ADULT - MEDICATION SUMMARY - MEDICATIONS TO TAKE
I will START or STAY ON the medications listed below when I get home from the hospital:    spironolactone 25 mg oral tablet  -- 1 tab(s) by mouth once a day  -- Indication: For CHF    aspirin 81 mg oral tablet  -- 1 tab(s) by mouth once a day  -- Indication: For blood clot prevention    Entresto 49 mg-51 mg oral tablet  -- 1 tab(s) by mouth 2 times a day  -- Indication: For CHF    dofetilide 250 mcg oral capsule  -- 1 cap(s) by mouth 2 times a day    -- Indication: For heart rhythm management    Xarelto 20 mg oral tablet  -- 1 tab(s) by mouth once a day (in the evening)  home  last dose 8/12  -- Indication: For Resume 3/31/18 PM    Lipitor 20 mg oral tablet  -- 1 tab(s) by mouth once a day (at bedtime)  home  -- Indication: For Cholesterol management    Metoprolol Succinate  mg oral tablet, extended release  -- 1 tab(s) by mouth once a day  -- Indication: For heart rate/rhythm management    Toprol-XL 25 mg oral tablet, extended release  -- 1 tab(s) by mouth once a day  -- Indication: For heart rate/rhythm management    Lasix 20 mg oral tablet  -- 1 tab(s) by mouth once a day  -- Indication: For CHF    melatonin 5 mg oral tablet  -- 1 tab(s) by mouth once a day (at bedtime)  -- Indication: For insomnia    cyanocobalamin 1000 mcg oral tablet  -- 1 tab(s) by mouth once a day  -- Indication: For supplement    Vitamin D3 2000 intl units oral capsule  -- 1 cap(s) by mouth once a day  -- Indication: For supplement

## 2018-03-28 VITALS — DIASTOLIC BLOOD PRESSURE: 78 MMHG | SYSTOLIC BLOOD PRESSURE: 137 MMHG

## 2018-03-28 LAB
ANION GAP SERPL CALC-SCNC: 11 MMOL/L — SIGNIFICANT CHANGE UP (ref 5–17)
BUN SERPL-MCNC: 24 MG/DL — HIGH (ref 8–20)
CALCIUM SERPL-MCNC: 8.6 MG/DL — SIGNIFICANT CHANGE UP (ref 8.6–10.2)
CHLORIDE SERPL-SCNC: 102 MMOL/L — SIGNIFICANT CHANGE UP (ref 98–107)
CO2 SERPL-SCNC: 30 MMOL/L — HIGH (ref 22–29)
CREAT SERPL-MCNC: 0.67 MG/DL — SIGNIFICANT CHANGE UP (ref 0.5–1.3)
EOSINOPHIL # BLD AUTO: 0 K/UL — SIGNIFICANT CHANGE UP (ref 0–0.5)
EOSINOPHIL NFR BLD AUTO: 0.6 % — SIGNIFICANT CHANGE UP (ref 0–5)
GLUCOSE SERPL-MCNC: 118 MG/DL — HIGH (ref 70–115)
HCT VFR BLD CALC: 37.4 % — LOW (ref 42–52)
HGB BLD-MCNC: 12.5 G/DL — LOW (ref 14–18)
LYMPHOCYTES # BLD AUTO: 0.6 K/UL — LOW (ref 1–4.8)
LYMPHOCYTES # BLD AUTO: 11.2 % — LOW (ref 20–55)
MAGNESIUM SERPL-MCNC: 1.9 MG/DL — SIGNIFICANT CHANGE UP (ref 1.6–2.6)
MCHC RBC-ENTMCNC: 33 PG — HIGH (ref 27–31)
MCHC RBC-ENTMCNC: 33.4 G/DL — SIGNIFICANT CHANGE UP (ref 32–36)
MCV RBC AUTO: 98.7 FL — HIGH (ref 80–94)
MONOCYTES # BLD AUTO: 0.8 K/UL — SIGNIFICANT CHANGE UP (ref 0–0.8)
MONOCYTES NFR BLD AUTO: 14.4 % — HIGH (ref 3–10)
NEUTROPHILS # BLD AUTO: 3.9 K/UL — SIGNIFICANT CHANGE UP (ref 1.8–8)
NEUTROPHILS NFR BLD AUTO: 73.6 % — HIGH (ref 37–73)
PLATELET # BLD AUTO: 160 K/UL — SIGNIFICANT CHANGE UP (ref 150–400)
POTASSIUM SERPL-MCNC: 4.1 MMOL/L — SIGNIFICANT CHANGE UP (ref 3.5–5.3)
POTASSIUM SERPL-SCNC: 4.1 MMOL/L — SIGNIFICANT CHANGE UP (ref 3.5–5.3)
RBC # BLD: 3.79 M/UL — LOW (ref 4.6–6.2)
RBC # FLD: 13.6 % — SIGNIFICANT CHANGE UP (ref 11–15.6)
SODIUM SERPL-SCNC: 143 MMOL/L — SIGNIFICANT CHANGE UP (ref 135–145)
WBC # BLD: 5.3 K/UL — SIGNIFICANT CHANGE UP (ref 4.8–10.8)
WBC # FLD AUTO: 5.3 K/UL — SIGNIFICANT CHANGE UP (ref 4.8–10.8)

## 2018-03-28 PROCEDURE — 80048 BASIC METABOLIC PNL TOTAL CA: CPT

## 2018-03-28 PROCEDURE — 36415 COLL VENOUS BLD VENIPUNCTURE: CPT

## 2018-03-28 PROCEDURE — 85027 COMPLETE CBC AUTOMATED: CPT

## 2018-03-28 PROCEDURE — C1898: CPT

## 2018-03-28 PROCEDURE — C1721: CPT

## 2018-03-28 PROCEDURE — C1769: CPT

## 2018-03-28 PROCEDURE — 83735 ASSAY OF MAGNESIUM: CPT

## 2018-03-28 PROCEDURE — 93010 ELECTROCARDIOGRAM REPORT: CPT

## 2018-03-28 PROCEDURE — 71046 X-RAY EXAM CHEST 2 VIEWS: CPT | Mod: 26

## 2018-03-28 PROCEDURE — 93640 EP EVAL 1/2CHMBR PACG CVDFB: CPT

## 2018-03-28 PROCEDURE — 93005 ELECTROCARDIOGRAM TRACING: CPT

## 2018-03-28 PROCEDURE — C1894: CPT

## 2018-03-28 PROCEDURE — 33249 INSJ/RPLCMT DEFIB W/LEAD(S): CPT

## 2018-03-28 PROCEDURE — 71046 X-RAY EXAM CHEST 2 VIEWS: CPT

## 2018-03-28 PROCEDURE — C1777: CPT

## 2018-03-28 RX ADMIN — Medication 20 MILLIGRAM(S): at 11:13

## 2018-03-28 RX ADMIN — DOFETILIDE 250 MICROGRAM(S): 0.25 CAPSULE ORAL at 05:32

## 2018-03-28 RX ADMIN — Medication 81 MILLIGRAM(S): at 11:13

## 2018-03-28 RX ADMIN — SPIRONOLACTONE 25 MILLIGRAM(S): 25 TABLET, FILM COATED ORAL at 05:32

## 2018-03-28 RX ADMIN — Medication 100 MILLIGRAM(S): at 05:32

## 2018-03-28 RX ADMIN — Medication 100 MILLIGRAM(S): at 06:48

## 2018-03-28 RX ADMIN — SACUBITRIL AND VALSARTAN 1 TABLET(S): 24; 26 TABLET, FILM COATED ORAL at 05:32

## 2018-04-11 ENCOUNTER — APPOINTMENT (OUTPATIENT)
Dept: ELECTROPHYSIOLOGY | Facility: CLINIC | Age: 73
End: 2018-04-11
Payer: MEDICARE

## 2018-04-11 VITALS
SYSTOLIC BLOOD PRESSURE: 160 MMHG | WEIGHT: 193 LBS | HEIGHT: 70 IN | DIASTOLIC BLOOD PRESSURE: 80 MMHG | HEART RATE: 80 BPM | BODY MASS INDEX: 27.63 KG/M2

## 2018-04-11 PROCEDURE — 93000 ELECTROCARDIOGRAM COMPLETE: CPT | Mod: 59

## 2018-04-11 PROCEDURE — 93283 PRGRMG EVAL IMPLANTABLE DFB: CPT

## 2018-04-11 PROCEDURE — 99024 POSTOP FOLLOW-UP VISIT: CPT

## 2018-07-24 PROBLEM — I42.9 CARDIOMYOPATHY, UNSPECIFIED: Chronic | Status: ACTIVE | Noted: 2018-03-22

## 2018-07-24 PROBLEM — I73.9 PERIPHERAL VASCULAR DISEASE, UNSPECIFIED: Chronic | Status: ACTIVE | Noted: 2018-03-22

## 2018-07-24 PROBLEM — I50.20 UNSPECIFIED SYSTOLIC (CONGESTIVE) HEART FAILURE: Chronic | Status: ACTIVE | Noted: 2018-03-22

## 2018-07-24 PROBLEM — I48.91 UNSPECIFIED ATRIAL FIBRILLATION: Chronic | Status: ACTIVE | Noted: 2018-03-22

## 2018-07-24 PROBLEM — D64.9 ANEMIA, UNSPECIFIED: Chronic | Status: ACTIVE | Noted: 2018-03-22

## 2018-07-25 ENCOUNTER — APPOINTMENT (OUTPATIENT)
Dept: ELECTROPHYSIOLOGY | Facility: CLINIC | Age: 73
End: 2018-07-25
Payer: MEDICARE

## 2018-07-25 VITALS
SYSTOLIC BLOOD PRESSURE: 118 MMHG | DIASTOLIC BLOOD PRESSURE: 72 MMHG | BODY MASS INDEX: 27.26 KG/M2 | HEART RATE: 83 BPM | WEIGHT: 190 LBS

## 2018-07-25 DIAGNOSIS — Z95.5 PRESENCE OF CORONARY ANGIOPLASTY IMPLANT AND GRAFT: ICD-10-CM

## 2018-07-25 DIAGNOSIS — Z82.49 FAMILY HISTORY OF ISCHEMIC HEART DISEASE AND OTHER DISEASES OF THE CIRCULATORY SYSTEM: ICD-10-CM

## 2018-07-25 DIAGNOSIS — Z95.810 PRESENCE OF AUTOMATIC (IMPLANTABLE) CARDIAC DEFIBRILLATOR: ICD-10-CM

## 2018-07-25 DIAGNOSIS — Z95.0 PRESENCE OF CARDIAC PACEMAKER: ICD-10-CM

## 2018-07-25 PROCEDURE — 93000 ELECTROCARDIOGRAM COMPLETE: CPT | Mod: 59

## 2018-07-25 PROCEDURE — 93283 PRGRMG EVAL IMPLANTABLE DFB: CPT

## 2018-07-25 PROCEDURE — 99215 OFFICE O/P EST HI 40 MIN: CPT

## 2018-07-25 RX ORDER — SACUBITRIL AND VALSARTAN 97; 103 MG/1; MG/1
97-103 TABLET, FILM COATED ORAL TWICE DAILY
Refills: 0 | Status: ACTIVE | COMMUNITY

## 2018-07-25 RX ORDER — METOPROLOL SUCCINATE 25 MG/1
25 TABLET, EXTENDED RELEASE ORAL DAILY
Refills: 0 | Status: ACTIVE | COMMUNITY

## 2018-07-25 RX ORDER — SPIRONOLACTONE 25 MG/1
25 TABLET, FILM COATED ORAL DAILY
Qty: 90 | Refills: 1 | Status: ACTIVE | COMMUNITY

## 2018-07-25 RX ORDER — MELATONIN 5 MG
5 CAPSULE ORAL
Refills: 0 | Status: ACTIVE | COMMUNITY

## 2018-07-26 NOTE — PATIENT PROFILE ADULT. - NS TRANSFER PATIENT BELONGINGS
Last appt 12/4/17 right hip.  Advised pt to call PCP regarding work letter.  Appt for left hip 8/6/18 will need new x-rays.    Jewelry/Money (specify)/Clothing/Cell Phone/PDA (specify)

## 2018-10-23 ENCOUNTER — APPOINTMENT (OUTPATIENT)
Dept: CARDIOLOGY | Facility: CLINIC | Age: 73
End: 2018-10-23
Payer: MEDICARE

## 2018-10-23 VITALS
BODY MASS INDEX: 27.2 KG/M2 | RESPIRATION RATE: 16 BRPM | HEART RATE: 84 BPM | HEIGHT: 70 IN | OXYGEN SATURATION: 98 % | SYSTOLIC BLOOD PRESSURE: 138 MMHG | WEIGHT: 190 LBS | DIASTOLIC BLOOD PRESSURE: 80 MMHG

## 2018-10-23 DIAGNOSIS — I73.9 PERIPHERAL VASCULAR DISEASE, UNSPECIFIED: ICD-10-CM

## 2018-10-23 PROCEDURE — 99204 OFFICE O/P NEW MOD 45 MIN: CPT

## 2019-10-15 NOTE — DISCHARGE NOTE ADULT - ADDITIONAL INSTRUCTIONS
Pt's wife calling re test results.  Please call back Aspirin and Plavix for one month the discontinue aspirin and continue only Plavix and Xarelto Aspirin and Plavix for one month the discontinue aspirin and continue only Plavix and Xarelto  No heavy lifting or pushing/pulling with procedure arm for 2 weeks. No driving for 2 days. You may shower 24 hours following the procedure but avoid baths/swimming for 1 week. Check your wrist site for bleeding and/or swelling daily following procedure and call your doctor immediately if it occurs or if you experience increased pain at the site. Follow up with your cardiologist in 1-2 weeks. You may call [facility] if you have any questions/concerns regarding your procedure [phone].

## 2021-05-09 ENCOUNTER — EMERGENCY (EMERGENCY)
Facility: HOSPITAL | Age: 76
LOS: 1 days | Discharge: HOME | End: 2021-05-09
Attending: STUDENT IN AN ORGANIZED HEALTH CARE EDUCATION/TRAINING PROGRAM
Payer: MEDICARE

## 2021-05-09 VITALS
HEIGHT: 70 IN | OXYGEN SATURATION: 98 % | WEIGHT: 179.9 LBS | TEMPERATURE: 98 F | RESPIRATION RATE: 18 BRPM | HEART RATE: 71 BPM | DIASTOLIC BLOOD PRESSURE: 96 MMHG | SYSTOLIC BLOOD PRESSURE: 161 MMHG

## 2021-05-09 DIAGNOSIS — Z95.5 PRESENCE OF CORONARY ANGIOPLASTY IMPLANT AND GRAFT: Chronic | ICD-10-CM

## 2021-05-09 PROCEDURE — 99284 EMERGENCY DEPT VISIT MOD MDM: CPT | Mod: 25

## 2021-05-09 PROCEDURE — 70450 CT HEAD/BRAIN W/O DYE: CPT

## 2021-05-09 PROCEDURE — 99284 EMERGENCY DEPT VISIT MOD MDM: CPT

## 2021-05-09 PROCEDURE — 70450 CT HEAD/BRAIN W/O DYE: CPT | Mod: 26,MA

## 2021-05-09 NOTE — ED PROVIDER NOTE - CLINICAL SUMMARY MEDICAL DECISION MAKING FREE TEXT BOX
76 y/o male with hx of afib on xarelto, CAD, HTN, HLD presenting to the ED s/p head injury. Vitals stable. Neuro intact. Given blood thinner usage, plan for CT head to evaluate for occult bleed. Likely discharge pending negative scan. Kade Moraes, DO PGY2

## 2021-05-09 NOTE — ED ADULT NURSE NOTE - PMH
Anemia  received iron transfusion  Arthritis    Atrial fibrillation  on tikosyn and xarelto  BPH (Benign Prostatic Hyperplasia)    CAD (coronary artery disease)  s/p PCI LCx 2013 and mLAD 8/2017  Cardiomyopathy    HFrEF (heart failure with reduced ejection fraction)    HTN (hypertension)    Hypercholesteremia    Obesity    PAD (peripheral artery disease)  s/p PCI LLE stent 2014

## 2021-05-09 NOTE — ED ADULT NURSE NOTE - OBJECTIVE STATEMENT
74 y/o male hx afib on xarelto, CAD, CHF, HTN, presents to the ED from home c/o head injury today. 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 Denies fever, chills, n/v, weakness, abd pain, diarrhea/constipation, numbness/tingling, urinary s/s, headache, dizziness, LOC. Pt A&Ox3, in no respiratory distress, no CP, steady ambulation, no pain noted, neuro intact. PT safety maintained, call bell within reach and bed in the lowest position.

## 2021-05-09 NOTE — ED PROVIDER NOTE - ATTENDING CONTRIBUTION TO CARE
I performed a history and physical exam of the patient and discussed their management with the resident. I reviewed the resident's note and agree with the documented findings and plan of care. My medical decision making and observations are found above.    75m hx afib on xarelto p/w head injury (baseball to back of head). No loc, vomiting, vision changes. no other injuries/complaints. On exam, well apopearing, nad. cn2-12 grossly intact, normal speech and tone, normal gait. perrl. +mild contusion to R occipito/parietal scalp without signs of basilar/depressed skull fracture. no other evidence of trauma. will get ct given on ac. Likely d/c home with strict return precautions, close f/u.

## 2021-05-09 NOTE — ED PROVIDER NOTE - DOMESTIC TRAVEL HIGH RISK QUESTION
Annamarie K Ullrich is a 34 year old female  0 Para 0 presenting to for physical.     Last pap: 18   Mammogram: n/a  Colonoscopy/Cologuard: n/a    Tetanus: 10/26/12  Flu: season ended  LMP: 19    Concerns: patient would like to discuss dry skin on hands current medication not helping.    Medications reviewed and updated.    Over the last 2 weeks, how often have you been bothered by the following problems?          PHQ2 Score:  3  1. Little interest or pleasure in doing things?:  0  2. Feeling down, depressed, or hopeless?:  3     PHQ9 Score:  7  3. Trouble falling, staying asleep or sleeping too much?:  2  4. Feeling tired or having little energy?:  1  5. Poor appetite or overeating?:  1  6. Feeling bad about yourself - or that you are a failure or that you have let yourself or your family down?:  0  7. Trouble concentrating on things such as reading a newspaper or watching television?:  0  8. Moving or speaking so slowly that other people could have noticed? Or the opposite - being so fidgety or restless that you were moving around a lot more than usual?:  0  9. Thoughts that you would be better off dead, or of hurting yourself in some way?:  0       There are no preventive care reminders to display for this patient.     No

## 2021-05-09 NOTE — ED PROVIDER NOTE - NSFOLLOWUPINSTRUCTIONS_ED_ALL_ED_FT
The results of any blood tests and imaging studies completed during your visit today were discussed and explained to you and a copy provided with your discharge instructions. Please follow up with your primary care doctor within 48 hours.

## 2021-05-09 NOTE — ED PROVIDER NOTE - PATIENT PORTAL LINK FT
You can access the FollowMyHealth Patient Portal offered by [facility] by registering at the following website: [email or web address]. By joining [facility]’s FollowMyHealth portal, you will also be able to view your health information using other applications (apps) compatible with our system.

## 2021-05-09 NOTE — ED PROVIDER NOTE - OBJECTIVE STATEMENT
76 y/o male with hx of afib on xarelto, CHF, CAD, HTN, HLD presenting to the ED s/p head injury 1.5 hours PTA. 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 No LOC. No vomiting. No visual changes. Reports he iced the area and currently has no pain.

## 2021-05-09 NOTE — ED PROVIDER NOTE - PHYSICAL EXAMINATION
GENERAL: Awake, alert, NAD  HEENT: NC/AT, moist mucous membranes, PERRL, EOMI  LUNGS: CTAB, no wheezes or crackles   CARDIAC: RRR, no m/r/g  EXT: No edema, no calf tenderness, 2+ DP pulses bilaterally, no deformities.  NEURO: A&Ox3. Moving all extremities. CN 2-12 intact. 5/5 strengths all extremities.  SKIN: Warm and dry. No rash.  PSYCH: Normal affect.

## 2021-08-03 NOTE — ED PROVIDER NOTE - CARE PLAN
[facility] AMBULATORY ENCOUNTER  FAMILY PRACTICE OFFICE VISIT    CHIEF COMPLAINT:    Chief Complaint   Patient presents with   • Medication Management     3 month        SUBJECTIVE:  Farshad Dominguez is a 47 year old male who presented requesting evaluation for nicotine abuse.    The patient has been on Chantix since February 23, 2021. He has been able a tolerate the medication well, but he still smoking with the same frequency.  He smokes anywhere between half a pack to 1 pack a cigarettes a day, and this is been the case for the past 20 years.  He would be interested in trying a different medication.  We have discussed bupropion previously, this was listed as a potential alternative if the Chantix was not working for him.    His sleeping has been good.  His interest in things that normally interest him is also good.  No lingering feelings of guilt.  Energy level is decreased.  Ability to focus is good, as is his appetite.  No panic attacks or suicidal ideations.      He has been battling some reflux symptoms lately.  This has been enough where he would like to try something over-the-counter, and was wondering if we could give him a recommendation.  He has tried Tums, but these have not seem to help.  He is not interested in any prescription medication as of yet.    He does have a history of dyslipidemia, is currently on 10 mg of Lipitor.  He is due for a recheck on his lipids, LFTs.  He appears to be tolerating the Lipitor well.    REVIEW OF SYSTEMS:     Respiratory: Negative for cough, wheezing or shortness of breath.    Cardiovascular: Negative for chest pain, tachycardia, palpitations or paroxysmal nocturnal dyspnea.   Peripheral vascular:  No swelling in his legs or calf cramping noted.       OBJECTIVE:  PROBLEM LIST:   Patient Active Problem List   Diagnosis   • Hyperlipidemia       PAST HISTORIES:   I have reviewed the past medical history, family history, social history, medications and allergies listed in  the medical record as obtained by my nursing staff and support staff and agree with their documentation.  ALLERGIES:  No Known Allergies  Current Outpatient Medications   Medication Sig Dispense Refill   • varenicline (Chantix) 1 MG tablet Take 1 tablet by mouth 2 times daily. z72.0, z87.891, z71.6 180 tablet 0   • atorvastatin (Lipitor) 10 MG tablet Take 1 tablet by mouth daily. 90 tablet 0   • varenicline (Chantix Starting Month Will) 0.5 MG X 11 & 1 MG X 42 tablet Follow instructions on packaging- z72.0, z87.891, z71.6 53 tablet 0   • omeprazole (PrilOSEC) 20 MG capsule Take 1 capsule by mouth daily. 30 capsule 2   • buPROPion XL (WELLBUTRIN XL) 300 MG 24 hr tablet Take 1 tablet by mouth daily. Take after days 1-7 daily 30 tablet 1   • buPROPion XL (WELLBUTRIN XL) 150 MG 24 hr tablet Take 1 tablet by mouth daily. Take days 1-7 then increase to 300 mg daily 7 tablet 0     No current facility-administered medications for this visit.     Past Medical History:   Diagnosis Date   • Hyperlipidemia      Past Surgical History:   Procedure Laterality Date   • Dental surgery     • Knee surgery Right 2008    Miniscal tear     Social History     Tobacco Use   • Smoking status: Current Every Day Smoker     Packs/day: 1.00     Years: 26.00     Pack years: 26.00     Types: Cigarettes   • Smokeless tobacco: Never Used   Substance Use Topics   • Alcohol use: No   • Drug use: No     Social History     Tobacco Use   Smoking Status Current Every Day Smoker   • Packs/day: 1.00   • Years: 26.00   • Pack years: 26.00   • Types: Cigarettes   Smokeless Tobacco Never Used     Family History   Problem Relation Age of Onset   • Diabetes Father        PHYSICAL EXAM:   Visit Vitals  /86   Pulse 84   Temp 97.6 °F (36.4 °C) (Temporal)   Resp 16   Ht 6' (1.829 m)   Wt 89.8 kg   BMI 26.85 kg/m²       General Appearance:  Alert, cooperative, no distress  Head:  Normocephalic, without obvious abnormality, atraumatic.  Eyes:   Conjunctivae/corneas clear, no discharge noted.  Ears:  Tympanic membranes and external ear canals normal, both ears.  Nose:  No drainage.  Throat:  Oral mucosa pink, moist and lesion free.  Pharynx is clear.    Neck:  Supple, trachea midline, no adenopathy.  Thyroid has no enlargement/tenderness/nodules; no carotid bruit or jugular venous distention.  Lungs:  Clear to auscultation bilaterally, respirations unlabored.  Heart:  Regular rate and rhythm, S1 and S2 normal, no murmur, rub or gallop.  Abdomen:  Soft, non-tender, bowel sounds active,no masses, no organomegaly.          ASSESSMENT:   Farshad was seen today for medication management.    Diagnoses and all orders for this visit:    Hyperlipidemia, unspecified hyperlipidemia type  -     HEPATIC FUNCTION PANEL; Future  -     LIPID PANEL WITH REFLEX; Future    Tobacco abuse    Gastroesophageal reflux disease, unspecified whether esophagitis present    Other orders  -     omeprazole (PrilOSEC) 20 MG capsule; Take 1 capsule by mouth daily.  -     Discontinue: buPROPion XL (WELLBUTRIN XL) 150 MG 24 hr tablet; Take 1 tablet by mouth daily. Take days 1-7 then increase to 300 mg daily  -     Discontinue: buPROPion XL (WELLBUTRIN XL) 300 MG 24 hr tablet; Take 1 tablet by mouth daily. Take after days 1-7 daily  -     buPROPion XL (WELLBUTRIN XL) 300 MG 24 hr tablet; Take 1 tablet by mouth daily. Take after days 1-7 daily  -     buPROPion XL (WELLBUTRIN XL) 150 MG 24 hr tablet; Take 1 tablet by mouth daily. Take days 1-7 then increase to 300 mg daily         PLAN:     1. Tobacco abuse.  Will switch the patient off of Chantix to bupropion-150 mg for 7 days followed by 300 mg thereafter.  Would like to recheck him in 1 months time.  2. Hyperlipidemia.  Currently on 10 mg of atorvastatin, which is well tolerated.  Will check LFTs, lipids today as patient is fasting and these are due.  3. GERD.  Patient is interested in OTC medications at this point, will simply recommend 20  mg of omeprazole.  This is something that can be tapered off in 1-2 months.  An informational sheet on GERD was also given to him.    No follow-ups on file.    Instructions provided as documented in the after visit summary.  Wilfrid Vasquez PA-C  Supervising Physician: Dr. Roel Montoya     Principal Discharge DX:	Head injury

## 2021-08-16 ENCOUNTER — APPOINTMENT (OUTPATIENT)
Dept: DISASTER EMERGENCY | Facility: CLINIC | Age: 76
End: 2021-08-16

## 2021-08-16 DIAGNOSIS — Z01.818 ENCOUNTER FOR OTHER PREPROCEDURAL EXAMINATION: ICD-10-CM

## 2021-08-17 LAB — SARS-COV-2 N GENE NPH QL NAA+PROBE: NOT DETECTED

## 2021-08-19 ENCOUNTER — OUTPATIENT (OUTPATIENT)
Dept: OUTPATIENT SERVICES | Facility: HOSPITAL | Age: 76
LOS: 1 days | End: 2021-08-19
Payer: MEDICARE

## 2021-08-19 VITALS
HEIGHT: 68 IN | OXYGEN SATURATION: 96 % | TEMPERATURE: 98 F | RESPIRATION RATE: 16 BRPM | HEART RATE: 71 BPM | WEIGHT: 186.95 LBS

## 2021-08-19 VITALS
RESPIRATION RATE: 15 BRPM | OXYGEN SATURATION: 95 % | SYSTOLIC BLOOD PRESSURE: 132 MMHG | DIASTOLIC BLOOD PRESSURE: 72 MMHG | HEART RATE: 72 BPM

## 2021-08-19 DIAGNOSIS — R94.39 ABNORMAL RESULT OF OTHER CARDIOVASCULAR FUNCTION STUDY: ICD-10-CM

## 2021-08-19 DIAGNOSIS — Z95.5 PRESENCE OF CORONARY ANGIOPLASTY IMPLANT AND GRAFT: Chronic | ICD-10-CM

## 2021-08-19 DIAGNOSIS — Z95.810 PRESENCE OF AUTOMATIC (IMPLANTABLE) CARDIAC DEFIBRILLATOR: Chronic | ICD-10-CM

## 2021-08-19 LAB
ALBUMIN SERPL ELPH-MCNC: 4.4 G/DL — SIGNIFICANT CHANGE UP (ref 3.3–5)
ALP SERPL-CCNC: 44 U/L — SIGNIFICANT CHANGE UP (ref 40–120)
ALT FLD-CCNC: 17 U/L — SIGNIFICANT CHANGE UP (ref 10–45)
ANION GAP SERPL CALC-SCNC: 8 MMOL/L — SIGNIFICANT CHANGE UP (ref 5–17)
AST SERPL-CCNC: 24 U/L — SIGNIFICANT CHANGE UP (ref 10–40)
BILIRUB SERPL-MCNC: 1.2 MG/DL — SIGNIFICANT CHANGE UP (ref 0.2–1.2)
BUN SERPL-MCNC: 24 MG/DL — HIGH (ref 7–23)
CALCIUM SERPL-MCNC: 9.6 MG/DL — SIGNIFICANT CHANGE UP (ref 8.4–10.5)
CHLORIDE SERPL-SCNC: 101 MMOL/L — SIGNIFICANT CHANGE UP (ref 96–108)
CO2 SERPL-SCNC: 28 MMOL/L — SIGNIFICANT CHANGE UP (ref 22–31)
CREAT SERPL-MCNC: 1.08 MG/DL — SIGNIFICANT CHANGE UP (ref 0.5–1.3)
GLUCOSE SERPL-MCNC: 106 MG/DL — HIGH (ref 70–99)
HCT VFR BLD CALC: 42 % — SIGNIFICANT CHANGE UP (ref 39–50)
HGB BLD-MCNC: 13.7 G/DL — SIGNIFICANT CHANGE UP (ref 13–17)
MCHC RBC-ENTMCNC: 32.6 GM/DL — SIGNIFICANT CHANGE UP (ref 32–36)
MCHC RBC-ENTMCNC: 33.2 PG — SIGNIFICANT CHANGE UP (ref 27–34)
MCV RBC AUTO: 101.7 FL — HIGH (ref 80–100)
NRBC # BLD: 0 /100 WBCS — SIGNIFICANT CHANGE UP (ref 0–0)
PLATELET # BLD AUTO: 163 K/UL — SIGNIFICANT CHANGE UP (ref 150–400)
POTASSIUM SERPL-MCNC: 3.7 MMOL/L — SIGNIFICANT CHANGE UP (ref 3.5–5.3)
POTASSIUM SERPL-SCNC: 3.7 MMOL/L — SIGNIFICANT CHANGE UP (ref 3.5–5.3)
PROT SERPL-MCNC: 7.1 G/DL — SIGNIFICANT CHANGE UP (ref 6–8.3)
RBC # BLD: 4.13 M/UL — LOW (ref 4.2–5.8)
RBC # FLD: 13.3 % — SIGNIFICANT CHANGE UP (ref 10.3–14.5)
SODIUM SERPL-SCNC: 137 MMOL/L — SIGNIFICANT CHANGE UP (ref 135–145)
WBC # BLD: 4.17 K/UL — SIGNIFICANT CHANGE UP (ref 3.8–10.5)
WBC # FLD AUTO: 4.17 K/UL — SIGNIFICANT CHANGE UP (ref 3.8–10.5)

## 2021-08-19 PROCEDURE — 93005 ELECTROCARDIOGRAM TRACING: CPT

## 2021-08-19 PROCEDURE — 93010 ELECTROCARDIOGRAM REPORT: CPT

## 2021-08-19 PROCEDURE — 80053 COMPREHEN METABOLIC PANEL: CPT

## 2021-08-19 PROCEDURE — 93458 L HRT ARTERY/VENTRICLE ANGIO: CPT | Mod: 26

## 2021-08-19 PROCEDURE — 93458 L HRT ARTERY/VENTRICLE ANGIO: CPT

## 2021-08-19 PROCEDURE — C1887: CPT

## 2021-08-19 PROCEDURE — 99152 MOD SED SAME PHYS/QHP 5/>YRS: CPT

## 2021-08-19 PROCEDURE — C1769: CPT

## 2021-08-19 PROCEDURE — C1894: CPT

## 2021-08-19 PROCEDURE — 85027 COMPLETE CBC AUTOMATED: CPT

## 2021-08-19 RX ORDER — SODIUM CHLORIDE 9 MG/ML
3 INJECTION INTRAMUSCULAR; INTRAVENOUS; SUBCUTANEOUS EVERY 8 HOURS
Refills: 0 | Status: DISCONTINUED | OUTPATIENT
Start: 2021-08-19 | End: 2021-09-02

## 2021-08-19 RX ORDER — ASPIRIN/CALCIUM CARB/MAGNESIUM 324 MG
1 TABLET ORAL
Qty: 0 | Refills: 0 | DISCHARGE

## 2021-08-19 RX ORDER — CLOPIDOGREL BISULFATE 75 MG/1
1 TABLET, FILM COATED ORAL
Qty: 0 | Refills: 0 | DISCHARGE
Start: 2021-08-19

## 2021-08-19 RX ORDER — METOPROLOL TARTRATE 50 MG
1 TABLET ORAL
Qty: 0 | Refills: 0 | DISCHARGE

## 2021-08-19 RX ORDER — SACUBITRIL AND VALSARTAN 24; 26 MG/1; MG/1
1 TABLET, FILM COATED ORAL
Qty: 0 | Refills: 0 | DISCHARGE

## 2021-08-19 RX ORDER — LANOLIN ALCOHOL/MO/W.PET/CERES
1 CREAM (GRAM) TOPICAL
Qty: 0 | Refills: 0 | DISCHARGE

## 2021-08-19 NOTE — ASU DISCHARGE PLAN (ADULT/PEDIATRIC) - CALL YOUR DOCTOR IF YOU HAVE ANY OF THE FOLLOWING:
bleeding or swelling/Pain not relieved by Medications/Wound/Surgical Site with redness, or foul smelling discharge or pus/Numbness, tingling, color or temperature change to extremity

## 2021-08-19 NOTE — H&P CARDIOLOGY - NSICDXPASTSURGICALHX_GEN_ALL_CORE_FT
PAST SURGICAL HISTORY:  Hernia inguinal    S/P coronary artery stent placement     S/P ICD (internal cardiac defibrillator) procedure March 2018  [employer]  Dr Sha Borja

## 2021-08-19 NOTE — H&P CARDIOLOGY - NSICDXPASTMEDICALHX_GEN_ALL_CORE_FT
PAST MEDICAL HISTORY:  Anemia received iron transfusion    Arthritis     Atrial fibrillation on tikosyn and xarelto    BPH (Benign Prostatic Hyperplasia)     CAD (coronary artery disease) s/p PCI LCx 2013 and mLAD 8/2017    Cardiomyopathy     HFrEF (heart failure with reduced ejection fraction)     HTN (hypertension)     Hypercholesteremia     Obesity     PAD (peripheral artery disease) s/p PCI LLE stent 2014

## 2021-08-19 NOTE — H&P CARDIOLOGY - HISTORY OF PRESENT ILLNESS
77 yo male PMH CAD with stent x2 (2013), HTN, HLD, Hx Afib (Xarelto last dose 8/16/21) s/p CV (2015), NSVT s/p cardioversion (2014), PVD with BL peripheral stents, pernicious anemia and MDT dual chamber ICD implant (DDD 70-130bpm) fro LV dysfunction (EF<30) presents today cardiac angiogram. Patient denies any complaints reports usual state of health, participates in weekly sports activities and plays golf weekly. Seen by Dr Merida for routine visit. Had NST revealing mild to moderate, small sized anterior ischemia with infarct of inferior wall. Here today for [facility] for further ischemic evaluation

## 2021-08-19 NOTE — ASU DISCHARGE PLAN (ADULT/PEDIATRIC) - CARE PROVIDER_API CALL
Viraj Merida (DO)  Cardiovascular Disease; Internal Medicine; Interventional Cardiology  [address] [place] [address]  Phone: [phone]  Fax: [phone]  Follow Up Time:

## 2023-03-11 ENCOUNTER — NON-APPOINTMENT (OUTPATIENT)
Age: 78
End: 2023-03-11

## 2023-03-11 ENCOUNTER — APPOINTMENT (OUTPATIENT)
Dept: ORTHOPEDIC SURGERY | Facility: CLINIC | Age: 78
End: 2023-03-11
Payer: MEDICARE

## 2023-03-11 VITALS
HEART RATE: 73 BPM | DIASTOLIC BLOOD PRESSURE: 95 MMHG | SYSTOLIC BLOOD PRESSURE: 145 MMHG | WEIGHT: 188 LBS | BODY MASS INDEX: 26.92 KG/M2 | HEIGHT: 70 IN

## 2023-03-11 DIAGNOSIS — M25.562 PAIN IN LEFT KNEE: ICD-10-CM

## 2023-03-11 DIAGNOSIS — M17.12 UNILATERAL PRIMARY OSTEOARTHRITIS, LEFT KNEE: ICD-10-CM

## 2023-03-11 PROCEDURE — 20610 DRAIN/INJ JOINT/BURSA W/O US: CPT | Mod: LT

## 2023-03-11 PROCEDURE — 73562 X-RAY EXAM OF KNEE 3: CPT | Mod: LT

## 2023-03-11 RX ORDER — METHYLPRED ACET/NACL,ISO-OS/PF 40 MG/ML
40 VIAL (ML) INJECTION
Qty: 1 | Refills: 0 | Status: COMPLETED | OUTPATIENT
Start: 2023-03-11

## 2023-03-11 RX ORDER — LIDOCAINE HYDROCHLORIDE 10 MG/ML
1 INJECTION, SOLUTION INFILTRATION; PERINEURAL
Refills: 0 | Status: COMPLETED | OUTPATIENT
Start: 2023-03-11

## 2023-03-11 RX ADMIN — Medication %: at 00:00

## 2023-03-11 RX ADMIN — METHYLPREDNISOLONE ACETATE MG/ML: 40 INJECTION, SUSPENSION INTRA-ARTICULAR; INTRALESIONAL; INTRAMUSCULAR; SOFT TISSUE at 00:00

## 2023-03-14 NOTE — DISCUSSION/SUMMARY
[Medication Risks Reviewed] : Medication risks reviewed [Surgical risks reviewed] : Surgical risks reviewed [de-identified] : The patient is a 77-year-old gentleman with bone on bone arthritis of their left knee . Based upon the patients continued symptoms and failure to respond to conservative treatment I have recommended a left total knee replacement for this patient. A long discussion took place with the patient describing what a total joint replacement is and what the procedure would entail. A total knee model, similar to the implant that will be used during the operation was utilized to demonstrate and to discuss the various bearing surfaces of the implants. The hospitalization and post-operative care and rehabilitation were also discussed. The use of perioperative antibiotics and DVT prophylaxis were discussed. The risk, benefits and alternatives to a surgical intervention were discussed at length with the patient. The patient was also advised of risks related to the medical comorbidities and elevated body mass index (BMI).  A lengthy discussion took place to review the most common complications including but not limited to: deep vein thrombosis, pulmonary embolus, heart attack, stroke, infection, wound breakdown, numbness, damage to nerves, tendon, muscles, arteries or other blood vessels, death and other possible complications from anesthesia. The patient was told that we will take steps to minimize these risks by using sterile technique, antibiotics and DVT prophylaxis when appropriate and follow the patient postoperatively in the office setting to monitor progress. The possibility of recurrent pain, no improvement in pain and actual worsening of pain were also discussed with the patient. \par \par The patient was advised of the goals, alternatives, risks and benefits of autologous, directed and banked blood product transfusions for perioperative blood losses.\par The discharge plan of care focused on the patient going home following surgery.  I encouraged the patient to make the necessary arrangements to have someone stay with them when they are discharged home.  Following discharge, a home care nurse will visit the patient.  He/she will open your home care case and request home physical therapy services.  Home physical therapy will commence following discharge provided it is appropriate and covered by his health insurance benefit plan.  \par \par The patient was advised that they will require a medical preoperative risk evaluation by their PCP. Further medical subspecialty clearances such as cardiac may be indicated if felt needed by their PCP.\par \par The benefits of surgery were discussed with the patient including the potential for improving his/her current clinical condition through operative intervention. Alternatives to surgical intervention including continued conservative management were also discussed in detail. All questions were answered to the satisfaction of the patient. The treatment plan of care, as well as a model of a total knee equivalent to the one that will be used for their total joint replacement, was shared with the patient.  The patient agreed to the plan of care as well as the use of implants in their total joint replacement.\par The patient participated in an interactive discussion of the left TKR implant planned for their surgery with questions answered, agreed with the treatment plan, and has decided to move forward with elective left TKR as planned.\par Patient needs medical/cardiac/vascular clearance prior to surgery.  He needs to be off the Xarelto prior to surgery.  All of his questions were answered to his satisfaction.  He would like to pursue surgery sometime in June 2023.  We did give him a cortisone injection today to temporize his symptoms for the next few months.\par

## 2023-03-14 NOTE — REASON FOR VISIT
[Knee Pain] : knee pain [Initial Visit] : an initial visit for [Osteoarthritis, Knee] : osteoarthritis of the knee [FreeTextEntry2] : Severe left knee pain

## 2023-03-14 NOTE — HISTORY OF PRESENT ILLNESS
[Worsening] : worsening [7] : an average pain level of 7/10 [4] : a minimum pain level of 4/10 [10] : a maximum pain level of 10/10 [Standing] : standing [Daily] : ~He/She~ states the symptoms seem to be occuring daily [Bending] : worsened by bending [Direct Pressure] : worsened by direct pressure [Lifting] : worsened by lifting [Sitting] : worsened by sitting [Running] : worsened by running [Walking] : worsened by walking [Knee Flexion] : worsened with knee flexion [Knee Extension] : worsened with knee extension [Acetaminophen] : relieved by acetaminophen [Exercise Regimen] : relieved by exercise regimen [Heat] : relieved by heat [Ice] : relieved by ice [Physical Therapy] : relieved by physical therapy [Rest] : relieved by rest [de-identified] : The patient is a 77-year-old gentleman with past medical history significant for multiple stent placements in the past, atrial fibrillation on Xarelto, pacemaker who presents with severe and debilitating left knee pain for many years getting more pronounced over the past year.  Patient has difficulty standing for long periods of time, walking for long periods of time, negotiate stairs.  Over the years has had cortisone injections, viscosupplementation injections, physical therapy and other conservative treatment modalities.  Patient does take Tylenol on a as needed basis for the discomfort.  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  He cannot do this anymore.  Patient is here to seek a definitive procedure for his left knee [Ataxia] : no ataxia [Incontinence] : no incontinence [Loss of Dexterity] : good dexterity [Urinary Ret.] : no urinary retention

## 2023-03-14 NOTE — END OF VISIT
[FreeTextEntry3] : I, Dr. Lois Werner MD, personally performed the evaluation and management services for this established patient who presented today with a new problem/exacerbation of an existing condition. That E/M includes conducting the examination, assessing all new/exacerbated conditions, and establishing a new plan of care. Today, MY ACP was here to assist with recording the history in my evaluation and management services of this new problem/exacerbated condition to be followed going forward.\par

## 2023-03-14 NOTE — PROCEDURE
[Injection] : Injection [Left] : of the left [Knee Joint] : knee joint [Effusion] : Effusion [Inflammation] : Inflammation [Diagnostic] : Diagnostic [Joint Pain] : Joint pain [Patient] : patient [Risk] : risk [Benefits] : benefits [Alternatives] : alternatives [Bleeding] : bleeding [Infection] : infection [Allergic Reaction] : allergic reaction [Verbal Consent Obtained] : verbal consent was obtained prior to the procedure [Ethyl Chloride Spray] : ethyl chloride spray was used as a topical anesthetic [Medial] : medial [22] : a 22-gauge [1% Lidocaine___(mL)] : [unfilled] mL of 1% Lidocaine [Methylpred. 40mg/mL___(mL)] : [unfilled] mL of 40mg/mL methylprednisolone [Bandage Applied] : a bandage [None] : none [PRN] : as needed [FreeTextEntry1] : Chlorhexidine

## 2023-03-14 NOTE — DISCUSSION/SUMMARY
[Medication Risks Reviewed] : Medication risks reviewed [Surgical risks reviewed] : Surgical risks reviewed [de-identified] : The patient is a 77-year-old gentleman with bone on bone arthritis of their left knee . Based upon the patients continued symptoms and failure to respond to conservative treatment I have recommended a left total knee replacement for this patient. A long discussion took place with the patient describing what a total joint replacement is and what the procedure would entail. A total knee model, similar to the implant that will be used during the operation was utilized to demonstrate and to discuss the various bearing surfaces of the implants. The hospitalization and post-operative care and rehabilitation were also discussed. The use of perioperative antibiotics and DVT prophylaxis were discussed. The risk, benefits and alternatives to a surgical intervention were discussed at length with the patient. The patient was also advised of risks related to the medical comorbidities and elevated body mass index (BMI).  A lengthy discussion took place to review the most common complications including but not limited to: deep vein thrombosis, pulmonary embolus, heart attack, stroke, infection, wound breakdown, numbness, damage to nerves, tendon, muscles, arteries or other blood vessels, death and other possible complications from anesthesia. The patient was told that we will take steps to minimize these risks by using sterile technique, antibiotics and DVT prophylaxis when appropriate and follow the patient postoperatively in the office setting to monitor progress. The possibility of recurrent pain, no improvement in pain and actual worsening of pain were also discussed with the patient. \par \par The patient was advised of the goals, alternatives, risks and benefits of autologous, directed and banked blood product transfusions for perioperative blood losses.\par The discharge plan of care focused on the patient going home following surgery.  I encouraged the patient to make the necessary arrangements to have someone stay with them when they are discharged home.  Following discharge, a home care nurse will visit the patient.  He/she will open your home care case and request home physical therapy services.  Home physical therapy will commence following discharge provided it is appropriate and covered by his health insurance benefit plan.  \par \par The patient was advised that they will require a medical preoperative risk evaluation by their PCP. Further medical subspecialty clearances such as cardiac may be indicated if felt needed by their PCP.\par \par The benefits of surgery were discussed with the patient including the potential for improving his/her current clinical condition through operative intervention. Alternatives to surgical intervention including continued conservative management were also discussed in detail. All questions were answered to the satisfaction of the patient. The treatment plan of care, as well as a model of a total knee equivalent to the one that will be used for their total joint replacement, was shared with the patient.  The patient agreed to the plan of care as well as the use of implants in their total joint replacement.\par The patient participated in an interactive discussion of the left TKR implant planned for their surgery with questions answered, agreed with the treatment plan, and has decided to move forward with elective left TKR as planned.\par Patient needs medical/cardiac/vascular clearance prior to surgery.  He needs to be off the Xarelto prior to surgery.  All of his questions were answered to his satisfaction.  He would like to pursue surgery sometime in June 2023.  We did give him a cortisone injection today to temporize his symptoms for the next few months.\par

## 2023-03-14 NOTE — PHYSICAL EXAM
[Antalgic] : antalgic [Wide-Based] : wide-based [UE/LE] : Sensory: Intact in bilateral upper & lower extremities [2+] : left 2+ [1+] : left 1+ [Normal RUE] : Right Upper Extremity: No scars, rashes, lesions, ulcers, skin intact [Normal LUE] : Left Upper Extremity: No scars, rashes, lesions, ulcers, skin intact [Normal RLE] : Right Lower Extremity: No scars, rashes, lesions, ulcers, skin intact [Normal LLE] : Left Lower Extremity: No scars, rashes, lesions, ulcers, skin intact [Normal] : No costovertebral angle tenderness and no spinal tenderness [de-identified] : General/Constitutional: Well appearing, 77year old male in no apparent distress; height and weight as detailed below; vital signs as detailed below\par Neuro:\par Orientation/Mental Status: Awake, alert, oriented to time, place, & person.\par Balance: Single leg balance intact on Left / Right @ 10 sec.\par Sensation: intact to fine & deep touch bilat. Feet/toes; \par EHL/AT(Peroneal N.): Bilat: 5/5\par \par Vascular: DP: Bilat: 1+ bilateral\par Cap refill 1-2 sec. all toes\par Calves non tender bilat; No El's Sx Bilat.\par Lymph: No enlarged LN in the popliteal chain bilaterally.\par Skin(LE): No cellulitis, edema, and min. venous varicosities bilaterally peripheral vascular disease, venous stasis changes\par MS:\par Gait: Antalgic gait to the left\par Stable heel/toe stride w/o device\par Left-sided gonalgic limp using no assistive devices at this time\par Function: There is  mod. difficulty mounting the exam table. \par \par Hips: Both hips have full ROM without stiffness or pain in hip flexion, extension, external rotation, and abduction. No klunk/instability noted bilaterally with ROM exam. No palpable tenderness in the trochanteric bursa bilat. Hip flexion/extension strength was 5/5 bilaterally.\par Left Knee: Skin is clean, dry, intact\par Swelling: Moderate\par Effusion: Moderate\par Alignment: Varus, medial joint line tenderness, positive medial Steinmann test\par Tenderness: Medial and posterior\par Incision: No visible incisions\par Skin Temp: Warm to touch\par ROM: Flexion: 110] deg.; Extension: -10 deg,\par Laxity A-P: Negative anterior posterior drawer\par Laxity M-L: Less than 5 degree laxity with varus valgus stresses\par PF crepitus: 2+ with significant pain\par Quadricep formation: attenuated in Extension & Flexion:\par Quad/Ham St: 5/5\par \par Right  Knee: Skin is clean, dry, intact\par Swelling: No swelling\par Effusion: No effusion\par Alignment: Slightly varus\par Tenderness: Some medial joint line tenderness\par Incision: No visible incisions\par Skin Temp: Warm to touch\par ROM: Flexion: [135] deg.; Extension: 0] deg,\par Laxity A-P: Negative anterior posterior drawer\par Laxity M-L: Less than 5 degree laxity with varus valgus stresses\par PF crepitus: 1+ ; without pain\par Quadricep formation: Intact / in Extension & Flexion:\par Quad/Ham St: 5/5\par \par  [de-identified] : Intact [de-identified] : Severe end-stage degenerative joint disease to the left knee with multiple subchondral cyst, periarticular osteophytes, bone-on-bone contact medial femoral-tibial part with a significant varus malalignment.

## 2023-03-14 NOTE — PHYSICAL EXAM
[Antalgic] : antalgic [Wide-Based] : wide-based [UE/LE] : Sensory: Intact in bilateral upper & lower extremities [2+] : left 2+ [1+] : left 1+ [Normal RUE] : Right Upper Extremity: No scars, rashes, lesions, ulcers, skin intact [Normal LUE] : Left Upper Extremity: No scars, rashes, lesions, ulcers, skin intact [Normal RLE] : Right Lower Extremity: No scars, rashes, lesions, ulcers, skin intact [Normal LLE] : Left Lower Extremity: No scars, rashes, lesions, ulcers, skin intact [Normal] : No costovertebral angle tenderness and no spinal tenderness [de-identified] : General/Constitutional: Well appearing, 77year old male in no apparent distress; height and weight as detailed below; vital signs as detailed below\par Neuro:\par Orientation/Mental Status: Awake, alert, oriented to time, place, & person.\par Balance: Single leg balance intact on Left / Right @ 10 sec.\par Sensation: intact to fine & deep touch bilat. Feet/toes; \par EHL/AT(Peroneal N.): Bilat: 5/5\par \par Vascular: DP: Bilat: 1+ bilateral\par Cap refill 1-2 sec. all toes\par Calves non tender bilat; No El's Sx Bilat.\par Lymph: No enlarged LN in the popliteal chain bilaterally.\par Skin(LE): No cellulitis, edema, and min. venous varicosities bilaterally peripheral vascular disease, venous stasis changes\par MS:\par Gait: Antalgic gait to the left\par Stable heel/toe stride w/o device\par Left-sided gonalgic limp using no assistive devices at this time\par Function: There is  mod. difficulty mounting the exam table. \par \par Hips: Both hips have full ROM without stiffness or pain in hip flexion, extension, external rotation, and abduction. No klunk/instability noted bilaterally with ROM exam. No palpable tenderness in the trochanteric bursa bilat. Hip flexion/extension strength was 5/5 bilaterally.\par Left Knee: Skin is clean, dry, intact\par Swelling: Moderate\par Effusion: Moderate\par Alignment: Varus, medial joint line tenderness, positive medial Steinmann test\par Tenderness: Medial and posterior\par Incision: No visible incisions\par Skin Temp: Warm to touch\par ROM: Flexion: 110] deg.; Extension: -10 deg,\par Laxity A-P: Negative anterior posterior drawer\par Laxity M-L: Less than 5 degree laxity with varus valgus stresses\par PF crepitus: 2+ with significant pain\par Quadricep formation: attenuated in Extension & Flexion:\par Quad/Ham St: 5/5\par \par Right  Knee: Skin is clean, dry, intact\par Swelling: No swelling\par Effusion: No effusion\par Alignment: Slightly varus\par Tenderness: Some medial joint line tenderness\par Incision: No visible incisions\par Skin Temp: Warm to touch\par ROM: Flexion: [135] deg.; Extension: 0] deg,\par Laxity A-P: Negative anterior posterior drawer\par Laxity M-L: Less than 5 degree laxity with varus valgus stresses\par PF crepitus: 1+ ; without pain\par Quadricep formation: Intact / in Extension & Flexion:\par Quad/Ham St: 5/5\par \par  [de-identified] : Intact [de-identified] : Severe end-stage degenerative joint disease to the left knee with multiple subchondral cyst, periarticular osteophytes, bone-on-bone contact medial femoral-tibial part with a significant varus malalignment.

## 2023-03-14 NOTE — REVIEW OF SYSTEMS
[Arthralgia] : arthralgia [Joint Pain] : joint pain [Joint Stiffness] : joint stiffness [Joint Swelling] : joint swelling [Negative] : Heme/Lymph [FreeTextEntry9] : Osteoarthritis [FreeTextEntry5] : A-fib, pacemaker, peripheral vascular disease

## 2023-03-14 NOTE — HISTORY OF PRESENT ILLNESS
[Worsening] : worsening [7] : an average pain level of 7/10 [4] : a minimum pain level of 4/10 [10] : a maximum pain level of 10/10 [Standing] : standing [Daily] : ~He/She~ states the symptoms seem to be occuring daily [Bending] : worsened by bending [Direct Pressure] : worsened by direct pressure [Lifting] : worsened by lifting [Sitting] : worsened by sitting [Running] : worsened by running [Walking] : worsened by walking [Knee Flexion] : worsened with knee flexion [Knee Extension] : worsened with knee extension [Acetaminophen] : relieved by acetaminophen [Exercise Regimen] : relieved by exercise regimen [Heat] : relieved by heat [Ice] : relieved by ice [Physical Therapy] : relieved by physical therapy [Rest] : relieved by rest [de-identified] : The patient is a 77-year-old gentleman with past medical history significant for multiple stent placements in the past, atrial fibrillation on Xarelto, pacemaker who presents with severe and debilitating left knee pain for many years getting more pronounced over the past year.  Patient has difficulty standing for long periods of time, walking for long periods of time, negotiate stairs.  Over the years has had cortisone injections, viscosupplementation injections, physical therapy and other conservative treatment modalities.  Patient does take Tylenol on a as needed basis for the discomfort.  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  He cannot do this anymore.  Patient is here to seek a definitive procedure for his left knee [Ataxia] : no ataxia [Incontinence] : no incontinence [Loss of Dexterity] : good dexterity [Urinary Ret.] : no urinary retention

## 2023-06-15 ENCOUNTER — OUTPATIENT (OUTPATIENT)
Dept: OUTPATIENT SERVICES | Facility: HOSPITAL | Age: 78
LOS: 1 days | End: 2023-06-15
Payer: MEDICARE

## 2023-06-15 VITALS
TEMPERATURE: 98 F | OXYGEN SATURATION: 99 % | WEIGHT: 195.99 LBS | SYSTOLIC BLOOD PRESSURE: 150 MMHG | RESPIRATION RATE: 14 BRPM | HEIGHT: 69 IN | HEART RATE: 69 BPM | DIASTOLIC BLOOD PRESSURE: 80 MMHG

## 2023-06-15 DIAGNOSIS — Z95.5 PRESENCE OF CORONARY ANGIOPLASTY IMPLANT AND GRAFT: Chronic | ICD-10-CM

## 2023-06-15 DIAGNOSIS — M17.12 UNILATERAL PRIMARY OSTEOARTHRITIS, LEFT KNEE: ICD-10-CM

## 2023-06-15 DIAGNOSIS — I73.9 PERIPHERAL VASCULAR DISEASE, UNSPECIFIED: Chronic | ICD-10-CM

## 2023-06-15 DIAGNOSIS — Z95.810 PRESENCE OF AUTOMATIC (IMPLANTABLE) CARDIAC DEFIBRILLATOR: Chronic | ICD-10-CM

## 2023-06-15 DIAGNOSIS — Z01.818 ENCOUNTER FOR OTHER PREPROCEDURAL EXAMINATION: ICD-10-CM

## 2023-06-15 DIAGNOSIS — Z98.890 OTHER SPECIFIED POSTPROCEDURAL STATES: Chronic | ICD-10-CM

## 2023-06-15 LAB
A1C WITH ESTIMATED AVERAGE GLUCOSE RESULT: 5.8 % — HIGH (ref 4–5.6)
ALBUMIN SERPL ELPH-MCNC: 4.2 G/DL — SIGNIFICANT CHANGE UP (ref 3.3–5)
ALP SERPL-CCNC: 45 U/L — SIGNIFICANT CHANGE UP (ref 30–120)
ALT FLD-CCNC: 23 U/L DA — SIGNIFICANT CHANGE UP (ref 10–60)
ANION GAP SERPL CALC-SCNC: 4 MMOL/L — LOW (ref 5–17)
APTT BLD: 38 SEC — HIGH (ref 27.5–35.5)
AST SERPL-CCNC: 26 U/L — SIGNIFICANT CHANGE UP (ref 10–40)
BILIRUB SERPL-MCNC: 1.4 MG/DL — HIGH (ref 0.2–1.2)
BLD GP AB SCN SERPL QL: SIGNIFICANT CHANGE UP
BUN SERPL-MCNC: 27 MG/DL — HIGH (ref 7–23)
CALCIUM SERPL-MCNC: 9 MG/DL — SIGNIFICANT CHANGE UP (ref 8.4–10.5)
CHLORIDE SERPL-SCNC: 103 MMOL/L — SIGNIFICANT CHANGE UP (ref 96–108)
CO2 SERPL-SCNC: 33 MMOL/L — HIGH (ref 22–31)
CREAT SERPL-MCNC: 1.23 MG/DL — SIGNIFICANT CHANGE UP (ref 0.5–1.3)
EGFR: 61 ML/MIN/1.73M2 — SIGNIFICANT CHANGE UP
ESTIMATED AVERAGE GLUCOSE: 120 MG/DL — HIGH (ref 68–114)
GLUCOSE SERPL-MCNC: 118 MG/DL — HIGH (ref 70–99)
HCT VFR BLD CALC: 42.6 % — SIGNIFICANT CHANGE UP (ref 39–50)
HGB BLD-MCNC: 14.3 G/DL — SIGNIFICANT CHANGE UP (ref 13–17)
INR BLD: 1.5 RATIO — HIGH (ref 0.88–1.16)
MCHC RBC-ENTMCNC: 33.6 GM/DL — SIGNIFICANT CHANGE UP (ref 32–36)
MCHC RBC-ENTMCNC: 33.6 PG — SIGNIFICANT CHANGE UP (ref 27–34)
MCV RBC AUTO: 100.2 FL — HIGH (ref 80–100)
MRSA PCR RESULT.: SIGNIFICANT CHANGE UP
NRBC # BLD: 0 /100 WBCS — SIGNIFICANT CHANGE UP (ref 0–0)
PLATELET # BLD AUTO: 168 K/UL — SIGNIFICANT CHANGE UP (ref 150–400)
POTASSIUM SERPL-MCNC: 4.9 MMOL/L — SIGNIFICANT CHANGE UP (ref 3.5–5.3)
POTASSIUM SERPL-SCNC: 4.9 MMOL/L — SIGNIFICANT CHANGE UP (ref 3.5–5.3)
PROT SERPL-MCNC: 7.3 G/DL — SIGNIFICANT CHANGE UP (ref 6–8.3)
PROTHROM AB SERPL-ACNC: 17.3 SEC — HIGH (ref 10.5–13.4)
RBC # BLD: 4.25 M/UL — SIGNIFICANT CHANGE UP (ref 4.2–5.8)
RBC # FLD: 13.2 % — SIGNIFICANT CHANGE UP (ref 10.3–14.5)
S AUREUS DNA NOSE QL NAA+PROBE: SIGNIFICANT CHANGE UP
SODIUM SERPL-SCNC: 140 MMOL/L — SIGNIFICANT CHANGE UP (ref 135–145)
WBC # BLD: 4.58 K/UL — SIGNIFICANT CHANGE UP (ref 3.8–10.5)
WBC # FLD AUTO: 4.58 K/UL — SIGNIFICANT CHANGE UP (ref 3.8–10.5)

## 2023-06-15 PROCEDURE — G0463: CPT

## 2023-06-15 PROCEDURE — 93010 ELECTROCARDIOGRAM REPORT: CPT

## 2023-06-15 PROCEDURE — 93005 ELECTROCARDIOGRAM TRACING: CPT

## 2023-06-15 RX ORDER — FUROSEMIDE 40 MG
1 TABLET ORAL
Qty: 0 | Refills: 0 | DISCHARGE

## 2023-06-15 NOTE — H&P PST ADULT - NSSUBSTANCEUSE_GEN_ALL_CORE_SD
Stated rash was getting better, Provided website info, stated does not see a dr but maybe once a year. Informed to call if need assist.   caffeine

## 2023-06-15 NOTE — H&P PST ADULT - HISTORY OF PRESENT ILLNESS
79 yo male with pmhx CAD s/p PCI LCx 2013 and mLAD 8/2017, chronic HFeEF  class II CHF, HTN, PAD s/p bilateral LE PCI and atrial fibrillation now  on Tikosyn and xarelto , s/p AICD            This is a 79 yo male with pmhx CAD s/p PCI LCx 2013 and mLAD 8/2017, chronic HFeEF  class II CHF, HTN, PAD s/p bilateral LE PCI and atrial fibrillation now  on Tikosyn and xarelto , s/p AICD 2018 presents with 2 year history of left knee pain . Reports intermittent pain and worse pain when getting up from sitting position , climbing stairs with pain scale 6/10 Received gel in the past with moderate relief . scheduled for left knee replacement on 6/26/23

## 2023-06-15 NOTE — H&P PST ADULT - NSICDXPASTMEDICALHX_GEN_ALL_CORE_FT
PAST MEDICAL HISTORY:  Anemia received iron transfusion    Atrial fibrillation on tikosyn and xarelto    BPH (Benign Prostatic Hyperplasia)     CAD (coronary artery disease) s/p PCI LCx 2013 and mLAD 8/2017    Cardiomyopathy     HFrEF (heart failure with reduced ejection fraction)     HTN (hypertension)     Hypercholesteremia     Obesity     Osteoarthritis of left knee     PAD (peripheral artery disease) s/p PCI LLE stent 2014

## 2023-06-15 NOTE — H&P PST ADULT - NSICDXPASTSURGICALHX_GEN_ALL_CORE_FT
PAST SURGICAL HISTORY:  Hernia inguinal    S/P coronary artery stent placement     S/P ICD (internal cardiac defibrillator) procedure March 2018  [employer]  Dr Sha Borja     PAST SURGICAL HISTORY:  Hernia inguinal    PAD (peripheral artery disease)     S/P coronary artery stent placement     S/P ICD (internal cardiac defibrillator) procedure March 2018  [employer]  Dr Sha Borja    S/P vascular surgery

## 2023-06-15 NOTE — H&P PST ADULT - NS MD HP INPLANTS MED DEV
AICD, coronary stents AICD, coronary stents, vascular/Automatic Implantable Cardioverter Defibrillator

## 2023-06-15 NOTE — H&P PST ADULT - PROBLEM SELECTOR PLAN 1
scheduled  for left knee replacement on 6/26/23  will obtain medical, cardiac and vascular clearance   pre op instructions on wash , medications , ERAS   Follow up with cardiologist on Xarelto and aspirin instruction for upcoming surgery.   AICD interrogation report

## 2023-06-15 NOTE — H&P PST ADULT - MUSCULOSKELETAL
left knee/normal/ROM intact/decreased ROM/decreased ROM due to pain/normal gait/strength 5/5 bilateral upper extremities/decreased strength details… left knee/ROM intact/decreased ROM/decreased ROM due to pain/normal gait/strength 5/5 bilateral upper extremities/decreased strength left knee/decreased ROM/decreased ROM due to pain/decreased strength

## 2023-06-23 NOTE — PATIENT PROFILE ADULT - FALL HARM RISK - UNIVERSAL INTERVENTIONS
Bed in lowest position, wheels locked, appropriate side rails in place/Call bell, personal items and telephone in reach/Instruct patient to call for assistance before getting out of bed or chair/Non-slip footwear when patient is out of bed/[place] to call system/Physically safe environment - no spills, clutter or unnecessary equipment/Purposeful Proactive Rounding/Room/bathroom lighting operational, light cord in reach

## 2023-06-26 ENCOUNTER — TRANSCRIPTION ENCOUNTER (OUTPATIENT)
Age: 78
End: 2023-06-26

## 2023-06-26 ENCOUNTER — APPOINTMENT (OUTPATIENT)
Dept: ORTHOPEDIC SURGERY | Facility: HOSPITAL | Age: 78
End: 2023-06-26

## 2023-06-26 ENCOUNTER — INPATIENT (INPATIENT)
Facility: HOSPITAL | Age: 78
LOS: 0 days | Discharge: HOME | DRG: 470 | End: 2023-06-27
Attending: ORTHOPAEDIC SURGERY | Admitting: ORTHOPAEDIC SURGERY
Payer: COMMERCIAL

## 2023-06-26 VITALS
DIASTOLIC BLOOD PRESSURE: 84 MMHG | SYSTOLIC BLOOD PRESSURE: 166 MMHG | RESPIRATION RATE: 20 BRPM | HEIGHT: 70 IN | WEIGHT: 195.11 LBS | HEART RATE: 75 BPM | OXYGEN SATURATION: 99 % | TEMPERATURE: 97 F

## 2023-06-26 DIAGNOSIS — Z95.810 PRESENCE OF AUTOMATIC (IMPLANTABLE) CARDIAC DEFIBRILLATOR: Chronic | ICD-10-CM

## 2023-06-26 DIAGNOSIS — Z95.5 PRESENCE OF CORONARY ANGIOPLASTY IMPLANT AND GRAFT: Chronic | ICD-10-CM

## 2023-06-26 DIAGNOSIS — M17.12 UNILATERAL PRIMARY OSTEOARTHRITIS, LEFT KNEE: ICD-10-CM

## 2023-06-26 DIAGNOSIS — I73.9 PERIPHERAL VASCULAR DISEASE, UNSPECIFIED: Chronic | ICD-10-CM

## 2023-06-26 DIAGNOSIS — Z98.890 OTHER SPECIFIED POSTPROCEDURAL STATES: Chronic | ICD-10-CM

## 2023-06-26 LAB
ANION GAP SERPL CALC-SCNC: 4 MMOL/L — LOW (ref 5–17)
APTT BLD: 32.1 SEC — SIGNIFICANT CHANGE UP (ref 27.5–35.5)
BUN SERPL-MCNC: 21 MG/DL — SIGNIFICANT CHANGE UP (ref 7–23)
CALCIUM SERPL-MCNC: 8.3 MG/DL — LOW (ref 8.4–10.5)
CHLORIDE SERPL-SCNC: 106 MMOL/L — SIGNIFICANT CHANGE UP (ref 96–108)
CO2 SERPL-SCNC: 31 MMOL/L — SIGNIFICANT CHANGE UP (ref 22–31)
CREAT SERPL-MCNC: 1.13 MG/DL — SIGNIFICANT CHANGE UP (ref 0.5–1.3)
EGFR: 67 ML/MIN/1.73M2 — SIGNIFICANT CHANGE UP
GLUCOSE BLDC GLUCOMTR-MCNC: 120 MG/DL — HIGH (ref 70–99)
GLUCOSE SERPL-MCNC: 144 MG/DL — HIGH (ref 70–99)
HCT VFR BLD CALC: 33.7 % — LOW (ref 39–50)
HGB BLD-MCNC: 11.1 G/DL — LOW (ref 13–17)
INR BLD: 1.16 RATIO — SIGNIFICANT CHANGE UP (ref 0.88–1.16)
MCHC RBC-ENTMCNC: 32.9 GM/DL — SIGNIFICANT CHANGE UP (ref 32–36)
MCHC RBC-ENTMCNC: 33.4 PG — SIGNIFICANT CHANGE UP (ref 27–34)
MCV RBC AUTO: 101.5 FL — HIGH (ref 80–100)
NRBC # BLD: 0 /100 WBCS — SIGNIFICANT CHANGE UP (ref 0–0)
PLATELET # BLD AUTO: 120 K/UL — LOW (ref 150–400)
POTASSIUM SERPL-MCNC: 4 MMOL/L — SIGNIFICANT CHANGE UP (ref 3.5–5.3)
POTASSIUM SERPL-SCNC: 4 MMOL/L — SIGNIFICANT CHANGE UP (ref 3.5–5.3)
PROTHROM AB SERPL-ACNC: 13.7 SEC — HIGH (ref 10.5–13.4)
RBC # BLD: 3.32 M/UL — LOW (ref 4.2–5.8)
RBC # FLD: 13.4 % — SIGNIFICANT CHANGE UP (ref 10.3–14.5)
SODIUM SERPL-SCNC: 141 MMOL/L — SIGNIFICANT CHANGE UP (ref 135–145)
WBC # BLD: 6.19 K/UL — SIGNIFICANT CHANGE UP (ref 3.8–10.5)
WBC # FLD AUTO: 6.19 K/UL — SIGNIFICANT CHANGE UP (ref 3.8–10.5)

## 2023-06-26 PROCEDURE — 73560 X-RAY EXAM OF KNEE 1 OR 2: CPT | Mod: 26,LT

## 2023-06-26 PROCEDURE — 27447 TOTAL KNEE ARTHROPLASTY: CPT | Mod: LT

## 2023-06-26 PROCEDURE — 99222 1ST HOSP IP/OBS MODERATE 55: CPT

## 2023-06-26 DEVICE — COMP FEM NON POROUS SZ 8 LT: Type: IMPLANTABLE DEVICE | Site: LEFT | Status: FUNCTIONAL

## 2023-06-26 DEVICE — ORTHOALIGN THREADED PIN HEADED: Type: IMPLANTABLE DEVICE | Site: LEFT | Status: FUNCTIONAL

## 2023-06-26 DEVICE — BONE WAX 2.5GM: Type: IMPLANTABLE DEVICE | Site: LEFT | Status: FUNCTIONAL

## 2023-06-26 DEVICE — COMP PATELLA TRI-PEG E-PLUS POLY 9X35MM: Type: IMPLANTABLE DEVICE | Site: LEFT | Status: FUNCTIONAL

## 2023-06-26 DEVICE — IMPLANTABLE DEVICE: Type: IMPLANTABLE DEVICE | Site: LEFT | Status: FUNCTIONAL

## 2023-06-26 DEVICE — INSERT TIB NONPOROUS UNIV SZ 9 LT: Type: IMPLANTABLE DEVICE | Site: LEFT | Status: FUNCTIONAL

## 2023-06-26 RX ORDER — OXYCODONE HYDROCHLORIDE 5 MG/1
10 TABLET ORAL
Refills: 0 | Status: DISCONTINUED | OUTPATIENT
Start: 2023-06-26 | End: 2023-06-27

## 2023-06-26 RX ORDER — ONDANSETRON 8 MG/1
4 TABLET, FILM COATED ORAL EVERY 6 HOURS
Refills: 0 | Status: DISCONTINUED | OUTPATIENT
Start: 2023-06-26 | End: 2023-06-27

## 2023-06-26 RX ORDER — DEXAMETHASONE 0.5 MG/5ML
8 ELIXIR ORAL ONCE
Refills: 0 | Status: COMPLETED | OUTPATIENT
Start: 2023-06-27 | End: 2023-06-27

## 2023-06-26 RX ORDER — SENNA PLUS 8.6 MG/1
2 TABLET ORAL AT BEDTIME
Refills: 0 | Status: DISCONTINUED | OUTPATIENT
Start: 2023-06-26 | End: 2023-06-27

## 2023-06-26 RX ORDER — ASPIRIN/CALCIUM CARB/MAGNESIUM 324 MG
1 TABLET ORAL
Refills: 0 | DISCHARGE

## 2023-06-26 RX ORDER — HYDROMORPHONE HYDROCHLORIDE 2 MG/ML
1 INJECTION INTRAMUSCULAR; INTRAVENOUS; SUBCUTANEOUS
Refills: 0 | Status: DISCONTINUED | OUTPATIENT
Start: 2023-06-26 | End: 2023-06-26

## 2023-06-26 RX ORDER — ACETAMINOPHEN 500 MG
1000 TABLET ORAL ONCE
Refills: 0 | Status: COMPLETED | OUTPATIENT
Start: 2023-06-26 | End: 2023-06-26

## 2023-06-26 RX ORDER — OXYCODONE HYDROCHLORIDE 5 MG/1
5 TABLET ORAL
Refills: 0 | Status: DISCONTINUED | OUTPATIENT
Start: 2023-06-26 | End: 2023-06-27

## 2023-06-26 RX ORDER — METOPROLOL TARTRATE 50 MG
25 TABLET ORAL AT BEDTIME
Refills: 0 | Status: DISCONTINUED | OUTPATIENT
Start: 2023-06-26 | End: 2023-06-27

## 2023-06-26 RX ORDER — METOPROLOL TARTRATE 50 MG
100 TABLET ORAL DAILY
Refills: 0 | Status: DISCONTINUED | OUTPATIENT
Start: 2023-06-26 | End: 2023-06-27

## 2023-06-26 RX ORDER — POLYETHYLENE GLYCOL 3350 17 G/17G
17 POWDER, FOR SOLUTION ORAL AT BEDTIME
Refills: 0 | Status: DISCONTINUED | OUTPATIENT
Start: 2023-06-26 | End: 2023-06-27

## 2023-06-26 RX ORDER — ACETAMINOPHEN 500 MG
1000 TABLET ORAL EVERY 8 HOURS
Refills: 0 | Status: DISCONTINUED | OUTPATIENT
Start: 2023-06-27 | End: 2023-06-27

## 2023-06-26 RX ORDER — TRANEXAMIC ACID 100 MG/ML
1000 INJECTION, SOLUTION INTRAVENOUS ONCE
Refills: 0 | Status: COMPLETED | OUTPATIENT
Start: 2023-06-26 | End: 2023-06-26

## 2023-06-26 RX ORDER — APREPITANT 80 MG/1
40 CAPSULE ORAL ONCE
Refills: 0 | Status: COMPLETED | OUTPATIENT
Start: 2023-06-26 | End: 2023-06-26

## 2023-06-26 RX ORDER — APIXABAN 2.5 MG/1
2.5 TABLET, FILM COATED ORAL EVERY 12 HOURS
Refills: 0 | Status: DISCONTINUED | OUTPATIENT
Start: 2023-06-27 | End: 2023-06-27

## 2023-06-26 RX ORDER — METOPROLOL TARTRATE 50 MG
1 TABLET ORAL
Qty: 0 | Refills: 0 | DISCHARGE

## 2023-06-26 RX ORDER — MAGNESIUM HYDROXIDE 400 MG/1
30 TABLET, CHEWABLE ORAL DAILY
Refills: 0 | Status: DISCONTINUED | OUTPATIENT
Start: 2023-06-26 | End: 2023-06-27

## 2023-06-26 RX ORDER — SPIRONOLACTONE 25 MG/1
1 TABLET, FILM COATED ORAL
Qty: 0 | Refills: 0 | DISCHARGE

## 2023-06-26 RX ORDER — CHLORHEXIDINE GLUCONATE 213 G/1000ML
1 SOLUTION TOPICAL ONCE
Refills: 0 | Status: COMPLETED | OUTPATIENT
Start: 2023-06-26 | End: 2023-06-26

## 2023-06-26 RX ORDER — CEFAZOLIN SODIUM 1 G
2000 VIAL (EA) INJECTION ONCE
Refills: 0 | Status: COMPLETED | OUTPATIENT
Start: 2023-06-26 | End: 2023-06-26

## 2023-06-26 RX ORDER — ASPIRIN/CALCIUM CARB/MAGNESIUM 324 MG
81 TABLET ORAL DAILY
Refills: 0 | Status: DISCONTINUED | OUTPATIENT
Start: 2023-06-27 | End: 2023-06-27

## 2023-06-26 RX ORDER — HYDROMORPHONE HYDROCHLORIDE 2 MG/ML
0.5 INJECTION INTRAMUSCULAR; INTRAVENOUS; SUBCUTANEOUS
Refills: 0 | Status: DISCONTINUED | OUTPATIENT
Start: 2023-06-26 | End: 2023-06-26

## 2023-06-26 RX ORDER — SACUBITRIL AND VALSARTAN 24; 26 MG/1; MG/1
1 TABLET, FILM COATED ORAL
Qty: 0 | Refills: 0 | DISCHARGE

## 2023-06-26 RX ORDER — PANTOPRAZOLE SODIUM 20 MG/1
40 TABLET, DELAYED RELEASE ORAL
Refills: 0 | Status: DISCONTINUED | OUTPATIENT
Start: 2023-06-26 | End: 2023-06-27

## 2023-06-26 RX ORDER — HYDROMORPHONE HYDROCHLORIDE 2 MG/ML
0.5 INJECTION INTRAMUSCULAR; INTRAVENOUS; SUBCUTANEOUS
Refills: 0 | Status: DISCONTINUED | OUTPATIENT
Start: 2023-06-26 | End: 2023-06-27

## 2023-06-26 RX ORDER — APIXABAN 2.5 MG/1
5 TABLET, FILM COATED ORAL EVERY 12 HOURS
Refills: 0 | Status: DISCONTINUED | OUTPATIENT
Start: 2023-06-28 | End: 2023-06-27

## 2023-06-26 RX ORDER — SACUBITRIL AND VALSARTAN 24; 26 MG/1; MG/1
1 TABLET, FILM COATED ORAL
Refills: 0 | Status: DISCONTINUED | OUTPATIENT
Start: 2023-06-28 | End: 2023-06-27

## 2023-06-26 RX ORDER — ONDANSETRON 8 MG/1
4 TABLET, FILM COATED ORAL ONCE
Refills: 0 | Status: DISCONTINUED | OUTPATIENT
Start: 2023-06-26 | End: 2023-06-26

## 2023-06-26 RX ORDER — DOFETILIDE 0.25 MG/1
250 CAPSULE ORAL EVERY 12 HOURS
Refills: 0 | Status: DISCONTINUED | OUTPATIENT
Start: 2023-06-26 | End: 2023-06-27

## 2023-06-26 RX ORDER — SODIUM CHLORIDE 9 MG/ML
1000 INJECTION, SOLUTION INTRAVENOUS
Refills: 0 | Status: DISCONTINUED | OUTPATIENT
Start: 2023-06-26 | End: 2023-06-26

## 2023-06-26 RX ORDER — CEFAZOLIN SODIUM 1 G
2000 VIAL (EA) INJECTION EVERY 8 HOURS
Refills: 0 | Status: COMPLETED | OUTPATIENT
Start: 2023-06-26 | End: 2023-06-27

## 2023-06-26 RX ORDER — ATORVASTATIN CALCIUM 80 MG/1
20 TABLET, FILM COATED ORAL AT BEDTIME
Refills: 0 | Status: DISCONTINUED | OUTPATIENT
Start: 2023-06-26 | End: 2023-06-27

## 2023-06-26 RX ORDER — SODIUM CHLORIDE 9 MG/ML
1000 INJECTION, SOLUTION INTRAVENOUS
Refills: 0 | Status: DISCONTINUED | OUTPATIENT
Start: 2023-06-27 | End: 2023-06-27

## 2023-06-26 RX ADMIN — SENNA PLUS 2 TABLET(S): 8.6 TABLET ORAL at 21:38

## 2023-06-26 RX ADMIN — ATORVASTATIN CALCIUM 20 MILLIGRAM(S): 80 TABLET, FILM COATED ORAL at 21:37

## 2023-06-26 RX ADMIN — APREPITANT 40 MILLIGRAM(S): 80 CAPSULE ORAL at 12:13

## 2023-06-26 RX ADMIN — SODIUM CHLORIDE 75 MILLILITER(S): 9 INJECTION, SOLUTION INTRAVENOUS at 18:16

## 2023-06-26 RX ADMIN — CHLORHEXIDINE GLUCONATE 1 APPLICATION(S): 213 SOLUTION TOPICAL at 12:13

## 2023-06-26 RX ADMIN — Medication 100 MILLIGRAM(S): at 23:00

## 2023-06-26 RX ADMIN — Medication 400 MILLIGRAM(S): at 21:35

## 2023-06-26 RX ADMIN — Medication 1000 MILLIGRAM(S): at 21:39

## 2023-06-26 RX ADMIN — Medication 25 MILLIGRAM(S): at 23:01

## 2023-06-26 RX ADMIN — POLYETHYLENE GLYCOL 3350 17 GRAM(S): 17 POWDER, FOR SOLUTION ORAL at 21:39

## 2023-06-26 NOTE — CONSULT NOTE ADULT - SUBJECTIVE AND OBJECTIVE BOX
77 yo male with pmhx CAD s/p PCI LCx 2013 and mLAD 8/2017, chronic HFeEF  class II CHF, HTN, PAD s/p bilateral LE PCI and atrial fibrillation now  on Tikosyn and xarelto , s/p AICD 2018 presents with 2 year history of left knee pain . Reports intermittent pain and worse pain when getting up from sitting position ,s/p lef total knee replacement day 0     Allergies:        Allergies:  	No Known Allergies:      Past Medical, Past Surgical, and Family History:  PAST MEDICAL HISTORY:  Anemia received iron transfusion    Atrial fibrillation on tikosyn and xarelto    BPH (Benign Prostatic Hyperplasia)     CAD (coronary artery disease) s/p PCI LCx 2013 and mLAD 8/2017    Cardiomyopathy     HFrEF (heart failure with reduced ejection fraction)     HTN (hypertension)     Hypercholesteremia     Obesity     Osteoarthritis of left knee     PAD (peripheral artery disease) s/p PCI LLE stent 2014.     PAST SURGICAL HISTORY:  Hernia inguinal    PAD (peripheral artery disease)     S/P coronary artery stent placement     S/P ICD (internal cardiac defibrillator) procedure March 2018  [employer]  Dr Sha Borja    S/P vascular surgery.       Home Medications:   * Patient Currently Takes Medications as of 15-Micah-2023 07:19 documented in Structured Notes  · 	dofetilide 250 mcg oral capsule: Last Dose Taken:  , 1 cap(s) orally 2 times a day  	  · 	Metoprolol Succinate ER 25 mg oral tablet, extended release: Last Dose Taken:  , 1 tab(s) orally once a day (at bedtime)  · 	Metoprolol Succinate  mg oral tablet, extended release: Last Dose Taken:  , 1 tab(s) orally once a day  · 	aspirin 81 mg oral tablet: Last Dose Taken:  , 1 orally once a day  · 	spironolactone 25 mg oral tablet: Last Dose Taken:  , 1 tab(s) orally once a day  · 	Vitamin D3 2000 intl units oral capsule: Last Dose Taken:  , 1 cap(s) orally once a day  · 	Lipitor 20 mg oral tablet: Last Dose Taken:  , 1 tab(s) orally once a day (at bedtime)  	home  · 	Xarelto 20 mg oral tablet: Last Dose Taken:  , 1 tab(s) orally once a day (in the evening)  	home  	  · 	Entresto 97 mg-103 mg oral tablet: Last Dose Taken:  , 1 tab(s) orally 2 times a day    Review of Systems:   · General	negative  · Skin/Breast	negative  · Ophthalmologic	negative  · ENMT	negative  · Negative Respiratory and Thorax Symptoms	no wheezing; no dyspnea; no cough  · Cardiovascular Symptoms	PAD s/p PCI bilateral LE, stents  · Cardiovascular Comments	CAD with stents , Afib on Xarelto , s/p ICD 2018  · Gastrointestinal	negative  · Negative General Genitourinary Symptoms	no hematuria; no renal colic; no flank pain L; no flank pain R  · Genitourinary Comments	BPH  · Musculoskeletal Symptoms	joint swelling; joint pain; left knee  · Negative Neurological Symptoms	no transient paralysis; no paresthesias; no generalized seizures  · Psychiatric	negative  · Hematology/Lymphatics Comments	anemia  · Endocrine	negative  · Allergic/Immunologic	negative     T/HR/RR/BP/SPO2:  · Temp (F)	98 Degrees F  · Temp (C)	36.7 Degrees C  · Heart Rate	69 /min  · Respiration Rate (breaths/min)	14 /min  · BP Systolic	150 mm Hg  · BP Diastolic	80 mm Hg  · Blood Pressure - Method	electronic  · BP Noninvasive Mean	103 mm Hg  · SpO2 (%)	99 %  · O2 Delivery/Oxygen Delivery Method	room air    Physical Exam:    Physical Exam:  · Constitutional	well-groomed; no distress  · Eyes	PERRL; conjunctiva clear  · ENMT	mouth  · Mouth	moist; partial dentures top and bottom  · Respiratory	clear to auscultation bilaterally; no wheezes; no rales  · Cardiovascular	regular rate and rhythm; S1 S2 present  · Gastrointestinal	soft; nontender; nondistended  · Genitourinary Female	not applicable  · Neurological	sensation intact; responds to pain; responds to verbal commands  · Mental Status	speech clear  · Gait/Balance	steady  · Skin	warm and dry; color normal

## 2023-06-26 NOTE — CONSULT NOTE ADULT - ASSESSMENT
79 yo male with pmhx CAD s/p PCI LCx 2013 and mLAD 8/2017, chronic HFeEF  class II CHF, HTN, PAD s/p bilateral LE PCI and atrial fibrillation now  on Tikosyn and xarelto , s/p AICD 2018 presents with 2 year history of left knee pain . Reports intermittent pain and worse pain when getting up from sitting position ,s/p lef total knee replacement day 0       s/p left total knee replacement day 0  pt/ot  pain control  DVT prophylaxis    CHF  entrestoa  aldactone hold of 24 hours    hld  lipitor    htn  toprol    DVT prophylaxis xarelto     time spent 45 minutes

## 2023-06-26 NOTE — BRIEF OPERATIVE NOTE - ELECTIVE PROCEDURE
The patient should hold her coumadin dose today, resume 4.5 mg of coumadin daily tomorrow and repeat INR Monday. Yes

## 2023-06-26 NOTE — PHYSICAL THERAPY INITIAL EVALUATION ADULT - GAIT TRAINING, PT EVAL
Patient will ambulate 150 feet independently with a rolling walker within 1-2 days for safe community ambulation. Patient will ascend/descend 4 stairs independently with +HR and straight cane within 1-2 days for safe household stair negotiation.

## 2023-06-26 NOTE — DISCHARGE NOTE PROVIDER - NSDCMRMEDTOKEN_GEN_ALL_CORE_FT
aspirin 81 mg oral tablet: 1 orally once a day  dofetilide 250 mcg oral capsule: 1 cap(s) orally 2 times a day    Entresto 97 mg-103 mg oral tablet: 1 tab(s) orally 2 times a day  Lipitor 20 mg oral tablet: 1 tab(s) orally once a day (at bedtime)  home  Metoprolol Succinate  mg oral tablet, extended release: 1 tab(s) orally once a day  Metoprolol Succinate ER 25 mg oral tablet, extended release: 1 tab(s) orally once a day (at bedtime)  spironolactone 25 mg oral tablet: 1 tab(s) orally once a day  Vitamin D3 2000 intl units oral capsule: 1 cap(s) orally once a day  Xarelto 20 mg oral tablet: 1 tab(s) orally once a day (in the evening)  home     acetaminophen 500 mg oral tablet: 2 tab(s) orally every 8 hours  aspirin 81 mg oral tablet: 1 orally once a day  dofetilide 250 mcg oral capsule: 1 cap(s) orally 2 times a day    Eliquis 2.5 mg oral tablet: 1 tab(s) orally once  Eliquis 5 mg oral tablet: 1 tab(s) orally every 12 hours Resume home dose of Xarelto after 14 Days of Eliquis 5mg q 12 hours  Entresto 97 mg-103 mg oral tablet: 1 tab(s) orally 2 times a day  Lipitor 20 mg oral tablet: 1 tab(s) orally once a day (at bedtime)  home  Metoprolol Succinate  mg oral tablet, extended release: 1 tab(s) orally once a day  Metoprolol Succinate ER 25 mg oral tablet, extended release: 1 tab(s) orally once a day (at bedtime)  omeprazole 20 mg oral delayed release capsule: 1 cap(s) orally once a day  oxyCODONE 5 mg oral tablet: 1 tab(s) orally every 4 to 6 hours as needed for  severe pain 1-2 tabs every 4-6 hours for pain, [place] [facility] Ref # [id number] MDD: 6  senna leaf extract oral tablet: 2 tab(s) orally once a day (at bedtime)  spironolactone 25 mg oral tablet: 1 tab(s) orally once a day  Vitamin D3 2000 intl units oral capsule: 1 cap(s) orally once a day

## 2023-06-26 NOTE — DISCHARGE NOTE PROVIDER - PROVIDER TOKENS
PROVIDER:[TOKEN:[[id number]],SCHEDULEDAPPT:[07/13/2023],SCHEDULEDAPPTTIME:[09:00 AM],ESTABLISHEDPATIENT:[T]]

## 2023-06-26 NOTE — DISCHARGE NOTE PROVIDER - HOSPITAL COURSE
This patient was admitted to [facility] with a history of severe degenerative joint disease of the left knee.  Patient underwent Pre-Surgical Testing and was medically cleared to undergo elective procedure. Patient underwent Left TKR by Dr. Lois Werner on 6/26/23. Procedure was well tolerated.  No operative or angie-operative complications arose during patient's hospital course.  Patient received antibiotic according to SCIP guidelines for infection prevention.  Eliquis 2.5mg q 12 h x 2 doses, then Eliquis 5mg q 12h, and Aspirin 81mg daily was given for DVT prophylaxis, in addition to the use of SCDs.  Anesthesia, Medical Hospitalist, Physical Therapy and Occupational Therapy were consulted. Patient is stable for discharge with a good prognosis.  Appropriate discharge instructions and medications are provided in this document.  Patient is going home with home care (PT/OT/Skilled Nursing).

## 2023-06-26 NOTE — DISCHARGE NOTE PROVIDER - NSDCFUSCHEDAPPT_GEN_ALL_CORE_FT
Tania Moser  [facility]  ORTHOSURG [address]  Scheduled Appointment: 07/13/2023    Lois Werner  [facility]  [facility]  Scheduled Appointment: 08/22/2023

## 2023-06-26 NOTE — DISCHARGE NOTE PROVIDER - NSDCCPCAREPLAN_GEN_ALL_CORE_FT
PRINCIPAL DISCHARGE DIAGNOSIS  Diagnosis: Primary osteoarthritis of left knee  Assessment and Plan of Treatment: For your total knee replacement:  Physical Therapy/Occupational Therapy for: ambulation, transfers, stairs, ADL's (activities of daily living), range of motion exercises, and isometrics  -Activity  • Weight Bearing as tolerated with rolling walker.  • Cryo/cuff 20 minutes several times daily with at least a 1 hour break in between icing sessions  • Take short, frequent walks increasing the distance that you walk each day as tolerated.  • Change your position every hour to decrease pain and stiffness.  • Continue the exercises taught to you by your physical therapist.  • No driving until cleared by the doctor.  • No tub baths, hot tubs, or swimming pools until instructed by your doctor.  • Do not squat down on the floor.  • Do not kneel or twist your knee.  • Range of Motion Goals: Flexion= 120 degrees, Extension = 0 degrees  You have a SAM dressing. You may shower. Disconnect SAM battery prior to showering. Reconnect battery after showering and press orange button to resume SAM power. Remove SAM dressing on post-op day #7.  You have a Prineo dressing over your incision (tape and glue).   Keep incision clean. DO NOT APPLY ANYTHING to incision site (salves/ointments/creams). Do not scrub incision site. Pat dry after shower.  Prineo removal 2 weeks after surgery at Surgeon's office.

## 2023-06-26 NOTE — DISCHARGE NOTE PROVIDER - CARE PROVIDER_API CALL
Lois Werner  Orthopaedic Surgery  [address], [address]  [place] [address]  Phone: [phone]  Fax: [phone]  Established Patient  Scheduled Appointment: 07/13/2023 09:00 AM

## 2023-06-26 NOTE — PHYSICAL THERAPY INITIAL EVALUATION ADULT - LEVEL OF INDEPENDENCE: SIT/STAND, REHAB EVAL
due to decreased sensation and motor control in b/l LE; pt unable to perform b/l quad sets in supine and unable to perform knee flexion/extension seated EOB/unable to perform

## 2023-06-26 NOTE — PHYSICAL THERAPY INITIAL EVALUATION ADULT - ADDITIONAL COMMENTS
Pt lives with wife in a private home, there are 4 stairs +HR to enter and no stairs to navigate within. Pt has a walk in shower. PTA pt was independent with ADLs and ambulation. Pt owns a RW and commode.

## 2023-06-26 NOTE — PRE-OP CHECKLIST - SPO2 (%)
Head, normocephalic, atraumatic, Face, Face within normal limits, Ears, External ears within normal limits, Nose/Nasopharynx, External nose  normal appearance, nares patent, no nasal discharge, Mouth and Throat, Oral cavity appearance normal, Breath odor normal, Lips, Appearance normal 99

## 2023-06-27 ENCOUNTER — TRANSCRIPTION ENCOUNTER (OUTPATIENT)
Age: 78
End: 2023-06-27

## 2023-06-27 VITALS
OXYGEN SATURATION: 97 % | HEART RATE: 68 BPM | DIASTOLIC BLOOD PRESSURE: 81 MMHG | SYSTOLIC BLOOD PRESSURE: 153 MMHG | TEMPERATURE: 98 F | RESPIRATION RATE: 16 BRPM

## 2023-06-27 LAB
ANION GAP SERPL CALC-SCNC: 4 MMOL/L — LOW (ref 5–17)
BUN SERPL-MCNC: 20 MG/DL — SIGNIFICANT CHANGE UP (ref 7–23)
CALCIUM SERPL-MCNC: 8.4 MG/DL — SIGNIFICANT CHANGE UP (ref 8.4–10.5)
CHLORIDE SERPL-SCNC: 107 MMOL/L — SIGNIFICANT CHANGE UP (ref 96–108)
CO2 SERPL-SCNC: 32 MMOL/L — HIGH (ref 22–31)
CREAT SERPL-MCNC: 1.38 MG/DL — HIGH (ref 0.5–1.3)
EGFR: 53 ML/MIN/1.73M2 — LOW
GLUCOSE SERPL-MCNC: 108 MG/DL — HIGH (ref 70–99)
HCT VFR BLD CALC: 32.7 % — LOW (ref 39–50)
HGB BLD-MCNC: 10.8 G/DL — LOW (ref 13–17)
MAGNESIUM SERPL-MCNC: 1.7 MG/DL — SIGNIFICANT CHANGE UP (ref 1.6–2.6)
MAGNESIUM SERPL-MCNC: 1.8 MG/DL — SIGNIFICANT CHANGE UP (ref 1.6–2.6)
MCHC RBC-ENTMCNC: 33 GM/DL — SIGNIFICANT CHANGE UP (ref 32–36)
MCHC RBC-ENTMCNC: 33.4 PG — SIGNIFICANT CHANGE UP (ref 27–34)
MCV RBC AUTO: 101.2 FL — HIGH (ref 80–100)
NRBC # BLD: 0 /100 WBCS — SIGNIFICANT CHANGE UP (ref 0–0)
PLATELET # BLD AUTO: 109 K/UL — LOW (ref 150–400)
POTASSIUM SERPL-MCNC: 4.2 MMOL/L — SIGNIFICANT CHANGE UP (ref 3.5–5.3)
POTASSIUM SERPL-SCNC: 4.2 MMOL/L — SIGNIFICANT CHANGE UP (ref 3.5–5.3)
RBC # BLD: 3.23 M/UL — LOW (ref 4.2–5.8)
RBC # FLD: 13.2 % — SIGNIFICANT CHANGE UP (ref 10.3–14.5)
SODIUM SERPL-SCNC: 143 MMOL/L — SIGNIFICANT CHANGE UP (ref 135–145)
WBC # BLD: 6.15 K/UL — SIGNIFICANT CHANGE UP (ref 3.8–10.5)
WBC # FLD AUTO: 6.15 K/UL — SIGNIFICANT CHANGE UP (ref 3.8–10.5)

## 2023-06-27 PROCEDURE — 99239 HOSP IP/OBS DSCHRG MGMT >30: CPT

## 2023-06-27 PROCEDURE — 73560 X-RAY EXAM OF KNEE 1 OR 2: CPT

## 2023-06-27 PROCEDURE — C1713: CPT

## 2023-06-27 PROCEDURE — 97161 PT EVAL LOW COMPLEX 20 MIN: CPT

## 2023-06-27 PROCEDURE — 97530 THERAPEUTIC ACTIVITIES: CPT

## 2023-06-27 PROCEDURE — 85610 PROTHROMBIN TIME: CPT

## 2023-06-27 PROCEDURE — 85730 THROMBOPLASTIN TIME PARTIAL: CPT

## 2023-06-27 PROCEDURE — 36415 COLL VENOUS BLD VENIPUNCTURE: CPT

## 2023-06-27 PROCEDURE — 97116 GAIT TRAINING THERAPY: CPT

## 2023-06-27 PROCEDURE — 83735 ASSAY OF MAGNESIUM: CPT

## 2023-06-27 PROCEDURE — 97165 OT EVAL LOW COMPLEX 30 MIN: CPT

## 2023-06-27 PROCEDURE — 82962 GLUCOSE BLOOD TEST: CPT

## 2023-06-27 PROCEDURE — 85027 COMPLETE CBC AUTOMATED: CPT

## 2023-06-27 PROCEDURE — C1889: CPT

## 2023-06-27 PROCEDURE — C1776: CPT

## 2023-06-27 PROCEDURE — 27447 TOTAL KNEE ARTHROPLASTY: CPT

## 2023-06-27 PROCEDURE — 97110 THERAPEUTIC EXERCISES: CPT

## 2023-06-27 PROCEDURE — 80048 BASIC METABOLIC PNL TOTAL CA: CPT

## 2023-06-27 PROCEDURE — 97535 SELF CARE MNGMENT TRAINING: CPT

## 2023-06-27 PROCEDURE — 20985 CPTR-ASST DIR MS PX: CPT

## 2023-06-27 RX ORDER — APIXABAN 2.5 MG/1
1 TABLET, FILM COATED ORAL
Qty: 28 | Refills: 0
Start: 2023-06-27 | End: 2023-07-10

## 2023-06-27 RX ORDER — OXYCODONE HYDROCHLORIDE 5 MG/1
1 TABLET ORAL
Qty: 20 | Refills: 0
Start: 2023-06-27 | End: 2023-07-03

## 2023-06-27 RX ORDER — RIVAROXABAN 15 MG-20MG
1 KIT ORAL
Qty: 0 | Refills: 0 | DISCHARGE

## 2023-06-27 RX ORDER — ACETAMINOPHEN 500 MG
2 TABLET ORAL
Qty: 0 | Refills: 0 | DISCHARGE
Start: 2023-06-27 | End: 2023-07-11

## 2023-06-27 RX ORDER — MAGNESIUM SULFATE 500 MG/ML
2 VIAL (ML) INJECTION ONCE
Refills: 0 | Status: COMPLETED | OUTPATIENT
Start: 2023-06-27 | End: 2023-06-27

## 2023-06-27 RX ORDER — OMEPRAZOLE 10 MG/1
1 CAPSULE, DELAYED RELEASE ORAL
Qty: 30 | Refills: 0
Start: 2023-06-27

## 2023-06-27 RX ORDER — SENNA PLUS 8.6 MG/1
2 TABLET ORAL
Qty: 0 | Refills: 0 | DISCHARGE
Start: 2023-06-27

## 2023-06-27 RX ORDER — APIXABAN 2.5 MG/1
1 TABLET, FILM COATED ORAL
Qty: 1 | Refills: 0
Start: 2023-06-27

## 2023-06-27 RX ADMIN — Medication 100 MILLIGRAM(S): at 05:30

## 2023-06-27 RX ADMIN — Medication 100 MILLIGRAM(S): at 05:49

## 2023-06-27 RX ADMIN — Medication 150 GRAM(S): at 02:00

## 2023-06-27 RX ADMIN — Medication 1000 MILLIGRAM(S): at 05:30

## 2023-06-27 RX ADMIN — Medication 81 MILLIGRAM(S): at 05:30

## 2023-06-27 RX ADMIN — APIXABAN 2.5 MILLIGRAM(S): 2.5 TABLET, FILM COATED ORAL at 08:36

## 2023-06-27 RX ADMIN — PANTOPRAZOLE SODIUM 40 MILLIGRAM(S): 20 TABLET, DELAYED RELEASE ORAL at 05:33

## 2023-06-27 RX ADMIN — Medication 1000 MILLIGRAM(S): at 13:17

## 2023-06-27 RX ADMIN — Medication 101.6 MILLIGRAM(S): at 05:30

## 2023-06-27 RX ADMIN — DOFETILIDE 250 MICROGRAM(S): 0.25 CAPSULE ORAL at 05:49

## 2023-06-27 RX ADMIN — Medication 1000 MILLIGRAM(S): at 05:32

## 2023-06-27 NOTE — PATIENT CHOICE NOTE. - NSPTCHOICESTATE_GEN_ALL_CORE

## 2023-06-27 NOTE — DISCHARGE NOTE NURSING/CASE MANAGEMENT/SOCIAL WORK - PATIENT PORTAL LINK FT
You can access the FollowMyHealth Patient Portal offered by [facility] by registering at the following website: [email or web address]. By joining [email or web address] FollowMyHealth portal, you will also be able to view your health information using other applications (apps) compatible with our system.

## 2023-06-27 NOTE — PROGRESS NOTE ADULT - ASSESSMENT
79 yo male with pmhx CAD s/p PCI LCx 2013 and mLAD 8/2017, chronic HFeEF  class II CHF, HTN, PAD s/p bilateral LE PCI and atrial fibrillation now  on Tikosyn and xarelto , s/p AICD 2018 presents with 2 year history of left knee pain . Reports intermittent pain and worse pain when getting up from sitting position ,s/p lef total knee replacement day 0       s/p LEFT TKR on 6/26  - Pain control  - Bowel regimen  - PT/OT  - Incentive spirometry  - NO NSAIDS given CAD  - declining platelet count likely consumptive, monitor  - bump in Cr (not ASAD), monitor, no further IVF for now given cardiac hx  - VTE PPx - eliquis  - stable for DC from medical perspective    Hx of chronic heart failure with reduced EF, CAD s/p stents, PVD, HTN, HLD, Afib  - resumed metoprolol and dofetilide  - resume ASA 81mg on POD#1  - resume entresto on POD#2  - resume aldactone on POD#2  - Eliquis for AC     time spent 45 minutes               77 yo male with pmhx CAD s/p PCI LCx 2013 and mLAD 8/2017, chronic HFeEF  class II CHF, HTN, PAD s/p bilateral LE PCI and atrial fibrillation now  on Tikosyn and xarelto , s/p AICD 2018 presents with 2 year history of left knee pain . Reports intermittent pain and worse pain when getting up from sitting position ,s/p lef total knee replacement day 0       s/p LEFT TKR on 6/26  - Pain control  - Bowel regimen  - PT/OT  - Incentive spirometry  - NO NSAIDS given CAD  - declining platelet count likely consumptive, monitor  - bump in Cr (not ASAD), monitor, no further IVF for now given cardiac hx  - VTE PPx - eliquis  - states has not voided since surgery, but fees like doing so now, check post void bladder scan  - stable for DC from medical perspective as long as he voids    Hx of chronic heart failure with reduced EF, CAD s/p stents, PVD, HTN, HLD, Afib  - resumed metoprolol and dofetilide  - resume ASA 81mg on POD#1  - resume entresto on POD#2  - resume aldactone on POD#2  - Eliquis for AC     time spent 45 minutes

## 2023-06-27 NOTE — DISCHARGE NOTE NURSING/CASE MANAGEMENT/SOCIAL WORK - NSDCVIVACCINE_GEN_ALL_CORE_FT
No Vaccines Administered. Methotrexate Counseling:  Patient counseled regarding adverse effects of methotrexate including but not limited to nausea, vomiting, abnormalities in liver function tests. Patients may develop mouth sores, rash, diarrhea, and abnormalities in blood counts. The patient understands that monitoring is required including LFT's and blood counts.  There is a rare possibility of scarring of the liver and lung problems that can occur when taking methotrexate. Persistent nausea, loss of appetite, pale stools, dark urine, cough, and shortness of breath should be reported immediately. Patient advised to discontinue methotrexate treatment at least three months before attempting to become pregnant.  I discussed the need for folate supplements while taking methotrexate.  These supplements can decrease side effects during methotrexate treatment. The patient verbalized understanding of the proper use and possible adverse effects of methotrexate.  All of the patient's questions and concerns were addressed.

## 2023-06-27 NOTE — PROGRESS NOTE ADULT - SUBJECTIVE AND OBJECTIVE BOX
INTERVAL HPI/OVERNIGHT EVENTS:   Patient seen and examined.  Doing well this morning, pain controlled.  No fevers, chills, sweats, dizziness, HA, changes in vision, cp, palpitations, sob, persistent cough, n/v/d, abd pain, dysuria, focal weakness, or calf pain.     MEDICATIONS  (STANDING):  acetaminophen     Tablet .. 1000 milliGRAM(s) Oral every 8 hours  apixaban 2.5 milliGRAM(s) Oral every 12 hours  aspirin enteric coated 81 milliGRAM(s) Oral daily  atorvastatin 20 milliGRAM(s) Oral at bedtime  dofetilide. 250 MICROGram(s) Oral every 12 hours  lactated ringers. 1000 milliLiter(s) (75 mL/Hr) IV Continuous <Continuous>  metoprolol succinate  milliGRAM(s) Oral daily  metoprolol succinate ER 25 milliGRAM(s) Oral at bedtime  pantoprazole    Tablet 40 milliGRAM(s) Oral before breakfast  polyethylene glycol 3350 17 Gram(s) Oral at bedtime  senna 2 Tablet(s) Oral at bedtime    MEDICATIONS  (PRN):  HYDROmorphone  Injectable 0.5 milliGRAM(s) IV Push every 3 hours PRN Breakthrough pain  magnesium hydroxide Suspension 30 milliLiter(s) Oral daily PRN Constipation  ondansetron Injectable 4 milliGRAM(s) IV Push every 6 hours PRN Nausea and/or Vomiting  oxyCODONE    IR 10 milliGRAM(s) Oral every 3 hours PRN Severe Pain (7 - 10)  oxyCODONE    IR 5 milliGRAM(s) Oral every 3 hours PRN Moderate Pain (4 - 6)      REVIEW OF SYSTEMS:  See HPI,  all others negative    PHYSICAL EXAM:  Vital Signs Last 24 Hrs  T(C): 36.8 (27 Jun 2023 08:16), Max: 37 (26 Jun 2023 21:00)  T(F): 98.3 (27 Jun 2023 08:16), Max: 98.6 (26 Jun 2023 21:00)  HR: 69 (27 Jun 2023 08:16) (68 - 77)  BP: 150/77 (27 Jun 2023 08:16) (116/76 - 166/84)  BP(mean): --  RR: 16 (27 Jun 2023 08:16) (12 - 20)  SpO2: 96% (27 Jun 2023 08:16) (95% - 100%)    Parameters below as of 27 Jun 2023 08:16  Patient On (Oxygen Delivery Method): room air    GENERAL: NAD, well-groomed, well-developed, awake, alert, oriented x 3, fluent and coherent speech  EYES: EOMI, conjunctiva and sclera clear  ENMT: No tonsillar erythema, exudates, or enlargement; Moist mucous membranes, Good dentition, No lesions seen on oral mucosa  NECK: Supple, No JVD, No Cervical LAD, No thyromegaly, No thyroid nodules felt  NERVOUS SYSTEM:  Good concentration; Moving all 4 extremities against gravity; No gross sensory deficits, No facial droop  CHEST WALL: No masses  CHEST/LUNG: Clear to auscultation bilaterally with good air entry; No rales, rhonchi, wheezing, or rubs  HEART: Regular rate and rhythm; No murmurs, rubs, or gallops  ABDOMEN: Soft, Nontender, Nondistended, Bowel sounds present, No palpable masses or organomegaly, No bruits  EXTREMITIES:  2+ Peripheral Pulses, No clubbing, cyanosis, or edema, no calf tenderness in either leg  WOUND: Dressing c/d/i    Diagnostic Testing:                        10.8   6.15  )-----------( 109      ( 27 Jun 2023 06:00 )             32.7     27 Jun 2023 06:00    143    |  107    |  20     ----------------------------<  108    4.2     |  32     |  1.38     Ca    8.4        27 Jun 2023 06:00  Mg     1.7       27 Jun 2023 00:20      PT/INR - ( 26 Jun 2023 12:20 )   PT: 13.7 sec;   INR: 1.16 ratio         PTT - ( 26 Jun 2023 12:20 )  PTT:32.1 sec  Urinalysis Basic - ( 27 Jun 2023 06:00 )    Color: x / Appearance: x / SG: x / pH: x  Gluc: 108 mg/dL / Ketone: x  / Bili: x / Urobili: x   Blood: x / Protein: x / Nitrite: x   Leuk Esterase: x / RBC: x / WBC x   Sq Epi: x / Non Sq Epi: x / Bacteria: x

## 2023-06-27 NOTE — DISCHARGE NOTE NURSING/CASE MANAGEMENT/SOCIAL WORK - NSDCPEFALRISK_GEN_ALL_CORE
For information on Fall & Injury Prevention, visit: [email or web address] OR  [email or web address] OR  https://www.cdc.gov/steadi/patient.html

## 2023-06-27 NOTE — DISCHARGE NOTE NURSING/CASE MANAGEMENT/SOCIAL WORK - NSSCNAMETXT_GEN_ALL_CORE
[facility] care agency [phone] will reach out to you within 24-72 hours of your discharge to schedule home care visit/eval appointment with you. Please call agency for any queries regarding home care services

## 2023-06-27 NOTE — CARE COORDINATION ASSESSMENT. - NSCAREPROVIDERS_GEN_ALL_CORE_FT
CARE PROVIDERS:  Administration: Luly Borrero  Administration: Tony Gregorio  Admitting: Lois Werner  Attending: Lois Werner  Consultant: Macho Harrison  Consultant: Belinda Astudillo  Nurse: Alix Lazar  Nurse: Claire Fuentes  Nurse: Janelle Jaffe  Nurse: Crystal Pace  Nurse: Kimberly Morris  Occupational Therapy: Cyndi Hernández  Occupational Therapy: Nikky Morales  Override: Claire Fuentes  Override: Angelica Nielson  PCA/Nursing Assistant: Lamar Cano  PCA/Nursing Assistant: Fatmata Aragon  Physical Therapy: Shabnam Chinchilla  Physical Therapy: Med Upton  Primary Team: Reji Jarrett  Primary Team: Adolph Morelos  Primary Team: Yesi Michele  Primary Team: Arslan Willett  Primary Team: Prasanth Ma  Primary Team: Radha Salazar  Primary Team: Kia Mays  Quality Review: Julissa Vidal  Team: [facility], Team  Team: [facility], Team

## 2023-06-27 NOTE — PROGRESS NOTE ADULT - SUBJECTIVE AND OBJECTIVE BOX
Discharge medication calendar:  eliquis 2.5 mg q12h x 2 doses eliquis  5 mg q12h x 2 weeks  Omeprazole 20mg QAM x 4 weeks  No Nsaids  APAP 1000mg q8h x 2-3 weeks  Narcotic PRN  Docusate 100mg TID while taking narcotic  Miralax, Senna, or Bisacodyl PRN for treatment of constipation   Discharge medication calendar:  eliquis 2.5 mg q12h x 2 doses eliquis  5 mg q12h x 2 weeks  aspirin 81 mg daily (hx of stent)  Omeprazole 20mg QAM x 4 weeks  No Nsaids  APAP 1000mg q8h x 2-3 weeks  Narcotic PRN  Docusate 100mg TID while taking narcotic  Miralax, Senna, or Bisacodyl PRN for treatment of constipation

## 2023-06-27 NOTE — PROGRESS NOTE ADULT - REASON FOR ADMISSION
Patient is a 78y old  Male who presents with a chief complaint of Left TKR for severe left knee OA.  (26 Jun 2023 17:31)

## 2023-06-27 NOTE — PHARMACOTHERAPY INTERVENTION NOTE - COMMENTS
Admission medication reconciliation POD1  
Meds to Bed (M2B) received from [facility] pharmacy were delivered to patient at bed side.  Pharmacy Staff did a final review/inquiry with the patient to ensure understanding and use of medication that was provided. Patient questions or concerns about their discharge drug therapy were addressed for their transition home.  All issues were addressed and well wishes provided.  
Transition of Care video discharge education - medication calendar given to patient
Reached out to patient regarding 3 day interruption of their Eliquis prior to surgery date to ensure the patient can receive neuraxial anesthesia

## 2023-06-27 NOTE — CARE COORDINATION ASSESSMENT. - NSDCPLANSERVICES_GEN_ALL_CORE
CM met with patient at bedside.  Patient. A&O x 4. Pt s/p  PROCEDURES: Total left knee replacement.   Pt  resting comfortably / Pt has no complaints at this time.  CM explained role of CM and availability to assist with discharge planning throughout stay.   Provided CM contact information and pt. verbalized understanding. Patient lives in a private house with his wife, 4 steps with HR to enter. 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 Patient identified his wife  as his caregiver at home who will assist him during his recuperation and will transport him home and to MD appointments.   Pt. is agreeable with PT/OT's recommendations for d/c plan to home with HC/PT.  CM explained home care expectations, process, insurance provisions and home safety with good understanding.   Offered list of [facility] and pt. chose [facility] at home care agency.  Provided discharge resources folder. CM made referral accordingly.  Informed them of Anticipated  DC date for today 6/27/2023 and for SOC tomorrow 6/28/2023.  Patient provided with info on the transitional care management/health solutions team who will be following her upon DC.    Noted pt has a [facility] drsg in place/  Educational flyer provided and reviewed with the patient.  Pt verbalizing understanding and in agreement with d/c plans.   DME: Pt has a RW, commode at home.  PCP: Dr Thomson [phone]    Pt stated he will be receiving meds to bed from [facility] pharmacy prior to DC./Home Care

## 2023-06-27 NOTE — CARE COORDINATION ASSESSMENT. - NSPASTMEDSURGHISTORY_GEN_ALL_CORE_FT
PAST MEDICAL & SURGICAL HISTORY:  BPH (Benign Prostatic Hyperplasia)      Hypercholesteremia      HTN (hypertension)      Hernia  inguinal      CAD (coronary artery disease)  s/p PCI LCx 2013 and mLAD 8/2017      Obesity      S/P coronary artery stent placement      Anemia  received iron transfusion      Atrial fibrillation  on tikosyn and xarelto      Cardiomyopathy      HFrEF (heart failure with reduced ejection fraction)      PAD (peripheral artery disease)  s/p PCI LLE stent 2014      S/P ICD (internal cardiac defibrillator) procedure  March 2018  [employer]  Dr Sha Borja      Osteoarthritis of left knee      PAD (peripheral artery disease)      S/P vascular surgery

## 2023-06-27 NOTE — PROGRESS NOTE ADULT - SUBJECTIVE AND OBJECTIVE BOX
Post Op     SKYLAR CRAWFORD      78y        Male                                                                                                                 T(C): 36.8 (06-27-23 @ 08:16), Max: 37 (06-26-23 @ 21:00)  HR: 69 (06-27-23 @ 08:16) (68 - 77)  BP: 150/77 (06-27-23 @ 08:16) (116/76 - 166/84)  RR: 16 (06-27-23 @ 08:16) (12 - 20)  SpO2: 96% (06-27-23 @ 08:16) (95% - 100%)  Wt(kg): --    S/P   total knee replacement     Patient denies shortness of breath, chest pain, dyspnea, No complaints  Pain is 3 /10    Physical Exam    Extremity: Bilaterally:  No holmon                                           No Cord                                          PAS on                                          Neurovascular intact                                          Motor intact EHL/FHL                                          Sensation intact                                          Pulses intact DP/PT                                         Calves Soft                                         Dressing Clean / Dry / Intact autumn green light                                         Capillary refill with 5 seconds                          10.8   6.15  )-----------( 109      ( 27 Jun 2023 06:00 )             32.7       06-27    143  |  107  |  20  ----------------------------<  108<H>  4.2   |  32<H>  |  1.38<H>    Ca    8.4      27 Jun 2023 06:00  Mg     1.8     06-27        A/P  -- S/P total knee replacement     -  Medicine To Follow   - DVT prophylaxis PAS eliquis 2.5  > 5mg po bid per protocol  with asa 81mg po  daily  - PT & OT   - Analagesia  - Incentive Spirometry  - Discharge Planning  - Safety Precautions  -  CBC , BMP daily

## 2023-06-28 ENCOUNTER — TRANSCRIPTION ENCOUNTER (OUTPATIENT)
Age: 78
End: 2023-06-28

## 2023-07-05 RX ORDER — OXYCODONE 5 MG/1
5 TABLET ORAL
Qty: 30 | Refills: 0 | Status: ACTIVE | COMMUNITY
Start: 2023-07-05 | End: 1900-01-01

## 2023-07-12 ENCOUNTER — TRANSCRIPTION ENCOUNTER (OUTPATIENT)
Age: 78
End: 2023-07-12

## 2023-07-13 ENCOUNTER — APPOINTMENT (OUTPATIENT)
Dept: ORTHOPEDIC SURGERY | Facility: CLINIC | Age: 78
End: 2023-07-13
Payer: MEDICARE

## 2023-07-13 VITALS
WEIGHT: 188 LBS | HEIGHT: 70 IN | DIASTOLIC BLOOD PRESSURE: 78 MMHG | HEART RATE: 84 BPM | BODY MASS INDEX: 26.92 KG/M2 | SYSTOLIC BLOOD PRESSURE: 163 MMHG

## 2023-07-13 PROCEDURE — 99024 POSTOP FOLLOW-UP VISIT: CPT

## 2023-07-13 PROCEDURE — 73562 X-RAY EXAM OF KNEE 3: CPT | Mod: LT

## 2023-07-13 NOTE — RETURN TO WORK/SCHOOL
[FreeTextEntry1] : Patient unable to return to work until 10/2/2023. Patient is status post left total knee replacement 6/26/2023.

## 2023-07-13 NOTE — HISTORY OF PRESENT ILLNESS
[___ Weeks Post Op] : [unfilled] weeks post op [Chills] : no chills [Constipation] : no constipation [Diarrhea] : no diarrhea [Dysuria] : no dysuria [Fever] : no fever [Nausea] : no nausea [Vomiting] : no vomiting [Clean/Dry/Intact] : clean, dry and intact [Erythema] : not erythematous [Discharge] : absent of discharge [Swelling] : not swollen [Dehiscence] : not dehisced [Neuro Intact] : an unremarkable neurological exam [Vascular Intact] : ~T peripheral vascular exam normal [Negative El's] : maneuvers demonstrated a negative El's sign [Doing Well] : is doing well [Excellent Pain Control] : has excellent pain control [No Sign of Infection] : is showing no signs of infection [de-identified] : Left total knee replacement 6/26/2023 [de-identified] : Patient is a 78-year-old male who presents today for an evaluation regarding the left knee.  He is status post left total knee replacement on June 26, 2023.  Overall the patient states he has been doing very well with little complaints of any pain or discomfort.  He states that he is slowly returning back to his previous activities.  I will finish home physical therapy and start outpatient therapy next week.  Currently he states the pain is a 2 on his scale of 1-10. [de-identified] : On physical examination of the left knee. Patient is status post left total knee replacement. There is a well healed surgical scar with no evidence of infection; superficial or deep. There is no redness, heat or discharge noted. There is some expected post op swelling with some evidence of resolving ecchymosis. Prineo tape is in place and properly secured. This was removed and Steri-Strips with Benzoin were then applied. There is some mild diffuse pain and tenderness noted particularly along the anterior scar. The overall alignment is in neutral position. There is no evidence of ligamentous laxity. This was tested in anterior and posterior drawer tests as well as varus and valgus stress test. The patient has adequate range of motion as they are able to fully extend the knee in flexion to at least 90°. This was achieved during active and passive range of motion. There is no evidence of any muscle atrophy or palpable defect. No evidence of any motor or sensory deficit. Patient has good distal pulses with no calf tenderness. Homen's test was negative. There is no evidence of impending compartment syndrome. Pt has excellent extension; as the pt is able to fully extend the knee. The pt is unable to fully flex the knee. Flexion is limited to approximately 90 degrees.\par   [de-identified] : X-rays of the left knee reveals a status post left total knee replacement. In all three views; AP, lateral and sunrise views. The hardware is in place and shows excellent alignment as well as fixation. There is no evidence of any fracture, dislocation or loosening of any hardware.\par  [de-identified] : Status post left total knee replacement on June 26, 2023 [de-identified] : Plan for the patient is to continue with the current management.  This includes ice packs, moist heat, leg elevation and pain medications as needed.  It is suggested that the pt continue with the physical therapy as well as at home exercises. A prescription was given to the patient.  This to include walking, isometric, range of motion and strengthening exercises.  The pt is also advised to continue with the anti-inflammatories to help with pain control and swelling. The pt is also encouraged to continue with the recommended blood thinners to help prevent a blood clot. These were originally prescribed when being discharged from the hospital. The patient is also advised to return back to the office in another 4 weeks for a reevaluation and x-rays.  However, if there is any increase in pain, redness, swelling, heat, discharge or fever. The patient is advised to notify the office, sooner.\par \par 35 minutes were spent, face to face, in direct consultation with the patient. This includes reviewing the natural history of their Dx., eliciting the history, performing an orthopedic exam, review of the x-ray findings, forming a differential Dx and discussing all treatment options. This Includes both surgical and non-surgical treatments. I also reviewed all the risks and benefits of non-operative & operative Tx options, future impact into orthopedic functions/problems, activity restrictions both at home and at work, and all follow up requirements.\par \par

## 2023-08-22 ENCOUNTER — APPOINTMENT (OUTPATIENT)
Dept: ORTHOPEDIC SURGERY | Facility: CLINIC | Age: 78
End: 2023-08-22
Payer: MEDICARE

## 2023-08-22 VITALS
HEIGHT: 70 IN | HEART RATE: 83 BPM | WEIGHT: 188 LBS | BODY MASS INDEX: 26.92 KG/M2 | DIASTOLIC BLOOD PRESSURE: 89 MMHG | SYSTOLIC BLOOD PRESSURE: 163 MMHG

## 2023-08-22 PROCEDURE — 73562 X-RAY EXAM OF KNEE 3: CPT | Mod: LT

## 2023-08-22 PROCEDURE — 99024 POSTOP FOLLOW-UP VISIT: CPT

## 2023-08-22 NOTE — END OF VISIT
[FreeTextEntry3] : I Dr. Werner, personally performed the evaluation and management services for this established patient who present today with a new problem/exacerbation of an existing condition. The E/M includes conducting the examination, assessing all new/exacerbated conditions, and establishing a new plan of care. Today, My ACP was here to assist in my evaluation and management services of this new problem/exacerbated condition to be followed going forward.

## 2023-08-22 NOTE — HISTORY OF PRESENT ILLNESS
[___ Months Post Op] : [unfilled] months post op [1] : the patient reports pain that is 1/10 in severity [Clean/Dry/Intact] : clean, dry and intact [Healed] : healed [Neuro Intact] : an unremarkable neurological exam [Vascular Intact] : ~T peripheral vascular exam normal [Negative El's] : maneuvers demonstrated a negative El's sign [Xray (Date:___)] : [unfilled] Xray -  [Doing Well] : is doing well [Excellent Pain Control] : has excellent pain control [No Sign of Infection] : is showing no signs of infection [Chills] : no chills [Fever] : no fever [Erythema] : not erythematous [Discharge] : absent of discharge [Swelling] : not swollen [Dehiscence] : not dehisced [de-identified] : Left TKR DOS: 6/28/23 [de-identified] : 78-year-old male presents for follow-up of the left knee status post left total knee replacement on 6/28/2023.  Overall in the postoperative period patient is doing well.  He states that his pain can be rated as a 1 out of 10 very intermittently.  He is not taking any anti-inflammatory pain medications.  He is doing physical therapy with improvement in symptoms.  He states that they are working on increasing his extension as well as flexion.  He is doing home exercises as well.  He is ambulating freely without any assistive devices.  There is a scab over the distal aspect of the incision which he states was bleeding on its own.  He has been managing on his own without any issues. Denies any fevers chills chest pain calf pain shortness of breath [de-identified] : On physical exam of the left knee skin is warm and dry without erythema ecchymosis signs or symptoms of infection superficial or deep, there is a well-healed surgical incision.  There is a 1 cm x 1 cm eschar at the distal aspect of the incision without any bleeding or drainage noted.  There is no tenderness to palpation throughout the knee joint.  There is no palpable effusion.  Range of motion is -2-110 with mild stiffness.  The knee is stable with varus and valgus stress.  There is a negative anterior posterior drawer.  Sensation is intact throughout the distal lower extremity.  Strength is well-maintained in all planes.  There is negative Homans exam.  2+ dorsalis pedis pulse. [de-identified] : Radiographs of the left knee were performed today. There are stable interfaces between bone, cement, and implant in all 3 components. There is stable positioning of the femoral, tibial, and patellar components. There is equidistant spacing on each side of the tibial bearing. There is no fracture, foreign body, subsidement, osteolysis, or notable effusion. The patellar component is tracking centrally in the trochlear groove of the femoral component.  [de-identified] : Dr. Werner evaluated this patient, reviewed the radiographs, and is guiding the patients orthopedic management. The patient was advised to continue their routine activities of daily living within tolerances, home exercises as guided by PT, and continue outpatient PT until goals are achieved.  Instructions for LE strengthening, ROM, and balance exercises were reviewed. The patient was advised to follow up with their PCP, if not done already, for a post op medical reevaluation including lab work.  The patient was advised to continue with regular orthopedic follow up post TKR and may come to our office if any new problems arise for urgent orthopedic reevaluation.  All patients questions and concerns were addressed to satisfaction. Advised the patient to continue with antibiotics prior to dental procedures as per Dr. Werner's protocol.

## 2023-08-23 ENCOUNTER — TRANSCRIPTION ENCOUNTER (OUTPATIENT)
Age: 78
End: 2023-08-23

## 2023-09-21 ENCOUNTER — TRANSCRIPTION ENCOUNTER (OUTPATIENT)
Age: 78
End: 2023-09-21

## 2024-05-20 PROBLEM — M17.12 UNILATERAL PRIMARY OSTEOARTHRITIS, LEFT KNEE: Chronic | Status: ACTIVE | Noted: 2023-06-15

## 2024-06-25 ENCOUNTER — APPOINTMENT (OUTPATIENT)
Dept: ORTHOPEDIC SURGERY | Facility: CLINIC | Age: 79
End: 2024-06-25
Payer: MEDICARE

## 2024-06-25 VITALS
DIASTOLIC BLOOD PRESSURE: 76 MMHG | WEIGHT: 190 LBS | SYSTOLIC BLOOD PRESSURE: 134 MMHG | HEART RATE: 84 BPM | HEIGHT: 70 IN | BODY MASS INDEX: 27.2 KG/M2

## 2024-06-25 DIAGNOSIS — Z96.652 PRESENCE OF LEFT ARTIFICIAL KNEE JOINT: ICD-10-CM

## 2024-06-25 PROCEDURE — 73562 X-RAY EXAM OF KNEE 3: CPT | Mod: LT

## 2024-06-25 PROCEDURE — 99213 OFFICE O/P EST LOW 20 MIN: CPT

## 2024-07-24 ENCOUNTER — APPOINTMENT (OUTPATIENT)
Dept: ELECTROPHYSIOLOGY | Facility: CLINIC | Age: 79
End: 2024-07-24
Payer: MEDICARE

## 2024-07-24 VITALS
HEIGHT: 70 IN | HEART RATE: 84 BPM | BODY MASS INDEX: 28.2 KG/M2 | SYSTOLIC BLOOD PRESSURE: 112 MMHG | WEIGHT: 197 LBS | DIASTOLIC BLOOD PRESSURE: 72 MMHG

## 2024-07-24 PROCEDURE — 93000 ELECTROCARDIOGRAM COMPLETE: CPT

## 2024-07-24 PROCEDURE — 99205 OFFICE O/P NEW HI 60 MIN: CPT

## 2024-07-24 NOTE — PHYSICAL EXAM
[General Appearance - Well Developed] : well developed [General Appearance - Well Nourished] : well nourished [Normal Conjunctiva] : the conjunctiva exhibited no abnormalities [Normal Oral Mucosa] : normal oral mucosa [Normal Jugular Venous V Waves Present] : normal jugular venous V waves present [Respiration, Rhythm And Depth] : normal respiratory rhythm and effort [Heart Rate And Rhythm] : heart rate and rhythm were normal [Heart Sounds] : normal S1 and S2 [Bowel Sounds] : normal bowel sounds [Abdomen Soft] : soft [Abnormal Walk] : normal gait [Nail Clubbing] : no clubbing of the fingernails [Cyanosis, Localized] : no localized cyanosis [Skin Turgor] : normal skin turgor [] : no rash [FreeTextEntry1] : left chest incision site healing well. small-mod hematoma, nontense. scattered echymosis on left axilla with signs of healing [Oriented To Time, Place, And Person] : oriented to person, place, and time [Impaired Insight] : insight and judgment were intact [No Anxiety] : not feeling anxious [Well Groomed] : well groomed [General Appearance - In No Acute Distress] : no acute distress [Auscultation Breath Sounds / Voice Sounds] : lungs were clear to auscultation bilaterally

## 2024-07-24 NOTE — HISTORY OF PRESENT ILLNESS
[FreeTextEntry1] : 79 year old gentleman with history of CAD s/p PCI (last mLAD in 6/2017), chronic systolic heart failure with severe LV dysfunction s/p dual chamber primary prevention ICD implantation on 3/27/18, NYHA class II, HTN, PVD, paroxysmal atrial fibrillation on Tikosyn, presenting for followup of AF.   He has been on Tikosyn for AF since 2015 at which time he had persistent AF and underwent DCCV, and Tikosyn initiation. He has done well with Tikosyn, with minimal subsequent recurrence. Recently on 6/1/24 he did have recurrent AF lasting about 43 hours. 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 He noted lightheadedness and balance difficulties during this. The AF then spontaneously stopped, and he has since felt well.  Otherwise the prior episode of AF noted was 6/18/2023.  He has been active and umpires multiple sports. He typically tries to remain well hydrated.   Due to progressive LV dysfunction and LVEF <35% with NSVT noted on monitoring, a primary prevention ICD was implanted in 2017.   At the time of ICD implant, a dual chamber device was chosen and atrial pacing programmed at 70bpm to prevent pause dependent arrhythmias.  He has been on GDMT, and his LV function has improved to LVEF 45%.   Interrogation of his dual chamber MDT ICD reveals appropriate device function in MVP-R 70 bpm mode. No  ventricular arrhythmias have been recorded, and AT/AF burden has been minimal apart from described above.  He has 76% atrial pacing, and <1% ventricular pacing. Atrial and ventricular pacing and sensing parameters are within normal limits.   ECG today reveals sinus rhythm with narrow QRS, and QTc 418 ms He is tolerating anticoagulation, though he notes easy bruising with mild injuries. he denies major bleeding.   current meds include Xarelto 20, ASA 81, dofetilide 250mcg qd, Entresto, lasix, Jardiance, Toprol 25, spironolactone, atorvastatin

## 2024-07-24 NOTE — CARDIOLOGY SUMMARY
[___] : [unfilled] [de-identified] : 7/24/24 sinus rhythm 84 bpm, narrow QRS, QTc 418ms [de-identified] : 8/2/2021 mild to mod abnormal with msall sized anterior ischemia with infarct of inferior wall. small area of mild HK in anterior and inferior walls. LVEF 48% [de-identified] : 11/9/23 LVEF 45% LA 4.1cm, mild MR, mild TR, RVSP 31 [de-identified] : MUGA 2022 LVEF 50%

## 2024-07-24 NOTE — REVIEW OF SYSTEMS
ADS provider asked triage to check with PCP if provider would like to treat for C-diff. [facility] can see pt if advised by PCP, but wife reported to [facility]S physician pt is walking ok with his walker and symptoms started today.  Please advice if ADS referral is appropriate   [Negative] : Heme/Lymph [SOB] : no shortness of breath [Dyspnea on exertion] : not dyspnea during exertion [Lower Ext Edema] : no extremity edema [Palpitations] : no palpitations [Syncope] : no syncope

## 2024-07-24 NOTE — DISCUSSION/SUMMARY
[EKG obtained to assist in diagnosis and management of assessed problem(s)] : EKG obtained to assist in diagnosis and management of assessed problem(s) [FreeTextEntry1] : 79 year old gentleman with history of CAD s/p PCI (last mLAD in 6/2017), chronic systolic heart failure with severe LV dysfunction s/p dual chamber primary prevention ICD implantation on 3/27/18, NYHA class II, HTN, PVD, and paroxysmal atrial fibrillation on Tikosyn,  He has been doing well on medical therapy with Tikosyn, with very low AF burden on this medication, and improvement in his LV function with sinus rhythm and GDMT. He recently had an episode of AF lasting 43 hours that may have been precipitated by dehydration on a hot day during which he was very active. The last prior episode of AF before this was over a year earlier. Though this episode was symptomatic, he has has generally been feeling well, and would like to continue medical therapy for now. He is tolerating Tikosyn well, and QTc remains acceptable today at 418 ms. He is tolerating anticoagulation and will continue Xarelto. We did discuss the option for AF ablation as an alternative strategy if his AF progresses, and more persistent AF is likely to result in further CHF exacerbation and cardiomyopathy. He will consider this as needed in the future.  His dual chamber ICD is functioning appropriately. Will maintain atrial pacing at >= 70 bpm to avoid risk of pause dependent arrhythmias.  -continue current meds including dofetilide 250mcg bid, and Xarelto -continue routine device followup. will need gen change in around 2.4 yrs -EP followup in 1 yr or prn

## 2024-07-24 NOTE — REASON FOR VISIT
[Arrhythmia/ECG Abnorrmalities] : arrhythmia/ECG abnormalities [FreeTextEntry3] : Dr Garcia [FreeTextEntry1] : last seen 2018

## 2024-07-24 NOTE — CARDIOLOGY SUMMARY
[___] : [unfilled] [de-identified] : 7/24/24 sinus rhythm 84 bpm, narrow QRS, QTc 418ms [de-identified] : 8/2/2021 mild to mod abnormal with msall sized anterior ischemia with infarct of inferior wall. small area of mild HK in anterior and inferior walls. LVEF 48% [de-identified] : 11/9/23 LVEF 45% LA 4.1cm, mild MR, mild TR, RVSP 31 [de-identified] : MUGA 2022 LVEF 50%

## 2024-07-24 NOTE — REVIEW OF SYSTEMS
[Negative] : Heme/Lymph [SOB] : no shortness of breath [Dyspnea on exertion] : not dyspnea during exertion [Lower Ext Edema] : no extremity edema [Palpitations] : no palpitations [Syncope] : no syncope

## 2025-07-23 ENCOUNTER — APPOINTMENT (OUTPATIENT)
Dept: ELECTROPHYSIOLOGY | Facility: CLINIC | Age: 80
End: 2025-07-23
Payer: MEDICARE

## 2025-07-23 VITALS
HEART RATE: 91 BPM | SYSTOLIC BLOOD PRESSURE: 116 MMHG | WEIGHT: 197 LBS | HEIGHT: 70 IN | DIASTOLIC BLOOD PRESSURE: 70 MMHG | BODY MASS INDEX: 28.2 KG/M2

## 2025-07-23 PROCEDURE — 93000 ELECTROCARDIOGRAM COMPLETE: CPT

## 2025-07-23 PROCEDURE — 99215 OFFICE O/P EST HI 40 MIN: CPT

## (undated) DEVICE — DRSG DERMABOND PRINEO 60CM

## (undated) DEVICE — SOL IRR BAG NS 0.9% 3000ML

## (undated) DEVICE — NDL SPINAL 18G X 3.5" (PINK)

## (undated) DEVICE — TOURNIQUET CUFF 34" DUAL PORT W PLC

## (undated) DEVICE — PACK TOTAL KNEE

## (undated) DEVICE — HOOD FLYTE STRYKER HELMET SHIELD

## (undated) DEVICE — ORTHOALIGN KNEEALIGN TIBIAL AND FEMORAL

## (undated) DEVICE — SUT VICRYL PLUS 1 27" CP UNDYED

## (undated) DEVICE — CRYO/CUFF GRAVITY COOLER KNEE LARGE

## (undated) DEVICE — WOUND IRR IRRISEPT W 0.5 CHG

## (undated) DEVICE — ELCTR AQUAMANTYS BIPOLAR SEALER 6.0

## (undated) DEVICE — SYR LUER LOK 50CC

## (undated) DEVICE — SOLIDIFIER CANN EXPRESS 3K

## (undated) DEVICE — WARMING BLANKET UPPER ADULT

## (undated) DEVICE — ORTHOALIGN PLUS UNIT

## (undated) DEVICE — SUT VICRYL PLUS 2-0 27" CP-1 UNDYED

## (undated) DEVICE — VENODYNE/SCD SLEEVE CALF MEDIUM

## (undated) DEVICE — HANDPIECE INTERPULSE W MULTI TIP

## (undated) DEVICE — SOL IRR POUR H2O 1500ML

## (undated) DEVICE — SPECIMEN CONTAINER PET

## (undated) DEVICE — DRSG PICO NPWT 4X12"

## (undated) DEVICE — SOL IRR POUR NS 0.9% 500ML

## (undated) DEVICE — ELCTR ROCKER SWITCH PENCIL BLUE 10FT